# Patient Record
Sex: FEMALE | Race: WHITE | NOT HISPANIC OR LATINO | Employment: FULL TIME | URBAN - METROPOLITAN AREA
[De-identification: names, ages, dates, MRNs, and addresses within clinical notes are randomized per-mention and may not be internally consistent; named-entity substitution may affect disease eponyms.]

---
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
## 2024-01-17 ENCOUNTER — OFFICE VISIT (OUTPATIENT)
Dept: CARDIOLOGY | Facility: CLINIC | Age: 19
End: 2024-01-17
Payer: COMMERCIAL

## 2024-01-17 VITALS
HEART RATE: 99 BPM | HEIGHT: 67 IN | SYSTOLIC BLOOD PRESSURE: 133 MMHG | BODY MASS INDEX: 20.4 KG/M2 | DIASTOLIC BLOOD PRESSURE: 83 MMHG | WEIGHT: 130 LBS

## 2024-01-17 DIAGNOSIS — R55 SYNCOPE AND COLLAPSE: ICD-10-CM

## 2024-01-17 DIAGNOSIS — R00.2 PALPITATIONS: Primary | ICD-10-CM

## 2024-01-17 RX ORDER — ESCITALOPRAM OXALATE 10 MG/1
10 TABLET ORAL DAILY
COMMUNITY

## 2024-01-17 NOTE — ASSESSMENT & PLAN NOTE
No further episodes of syncope, and tilt testing table was negative.  Encouraged to stay well-hydrated, notify us if she has any reoccurring symptoms.

## 2024-01-17 NOTE — LETTER
January 17, 2024     Mindi Ndiaye MD  [address] [facility] [address]    Patient: Washington Leonard   YOB: 2005   Date of Visit: 1/17/2024       Dear Mindi Ndiaye MD    Washington Leonard was in my office today. Below is a copy of my note.    If you have questions, please do not hesitate to call me. I look forward to following Washington along with you.         Sincerely,        ARTEMIO Sullivan        Chief Complaint  Syncope and collapse and Follow-up    Subjective       History of Present Illness  Washington Leonard presents to [facility] CARDIOLOGY   Ms. Leonard is a 18-year-old female patient coming in for cardiac follow-up.  She had evaluation due to dizziness, palpitations and syncopal episodes.  Since last seen in office, she has underwent Holter study, as well as echocardiogram and tilt table testing.  Tilt table testing was negative, echocardiogram showed normal LV function without valvular issues, Holter monitor did show frequent sinus tachycardia.  She was started on beta-blocker therapy.  She has seen improvement in symptoms of palpitations, although she is experiencing fatigue with the medication.  Denies any further episodes of syncope or presyncope.        Past Medical History:   Diagnosis Date   • Anemia    • Anxiety    • Chlamydia    • Depression    • Heart murmur    • Migraine    • Syncope        No Known Allergies     Past Surgical History:   Procedure Laterality Date   • WISDOM TOOTH EXTRACTION Bilateral         Social History  She  reports that she has never smoked. She has never been exposed to tobacco smoke. She has never used smokeless tobacco. She reports that she does not currently use alcohol. She reports that she does not use drugs.    Family History  Her family history includes Anxiety disorder in her mother and sister; Arrhythmia in her paternal grandmother; Breast cancer in her maternal aunt; Cirrhosis in her  "maternal grandmother; Depression in her mother; Diabetes in her maternal grandmother and paternal grandmother; Heart disease in her maternal grandfather; Hypertension in her maternal grandmother.       Current Outpatient Medications on File Prior to Visit   Medication Sig   • D-Mannose 500 MG capsule Take 1 capsule by mouth Daily. Recommended supp for kidneys   • escitalopram (LEXAPRO) 10 MG tablet Take 1 tablet by mouth Daily.   • ibuprofen (ADVIL,MOTRIN) 600 MG tablet Take 1 tablet by mouth Every 8 (Eight) Hours As Needed for Mild Pain.   • metoprolol tartrate (LOPRESSOR) 25 MG tablet Take 1 tablet by mouth Every Night.   • naproxen (NAPROSYN) 375 MG tablet Take 1 tablet by mouth 2 (Two) Times a Day With Meals.   • Tiffany 3-0.02 MG per tablet Take 1 tablet by mouth Daily.     No current facility-administered medications on file prior to visit.         Review of Systems   Constitutional:  Positive for fatigue.   Respiratory:  Negative for cough, chest tightness and shortness of breath.    Cardiovascular:  Negative for chest pain, palpitations and leg swelling.   Gastrointestinal:  Negative for vomiting.   Neurological:  Negative for dizziness and syncope.        Objective  Vitals:    01/17/24 1330   BP: 133/83   BP Location: Left arm   Pulse: 99   Weight: 59 kg (130 lb)   Height: 170.2 cm (67\")         Physical Exam  General : Alert, awake, no acute distress  Neck : Supple, no carotid bruit, no jugular venous distention  CVS : Regular rate and rhythm, no murmur, rubs or gallops  Lungs: Clear to auscultation bilaterally, no crackles or rhonchi  Abdomen: Soft, nontender, bowel sounds active  Extremities: Warm, well-perfused, no pedal edema      Result Review    The following data was reviewed by Kristie Fine, APRN  No results found for: \"PROBNP\"  CMP          4/13/2023    10:43 4/17/2023    11:35   CMP   Glucose 80  93    BUN 7  8    Creatinine 0.65  0.60    EGFR 131.1  133.6    Sodium 137  135    Potassium 4.7  4.1 "    Chloride 104  102    Calcium 10.3  9.4    Total Protein 7.8  7.8    Albumin 4.8  4.5    Globulin 3.0  3.3    Total Bilirubin 0.3  0.4    Alkaline Phosphatase 59  65    AST (SGOT) 25  16    ALT (SGPT) 12  12    Albumin/Globulin Ratio 1.6  1.4    BUN/Creatinine Ratio 10.8  13.3    Anion Gap 10.7  11.8      CBC w/diff          4/17/2023    11:35   CBC w/Diff   WBC 12.12    RBC 4.52    Hemoglobin 13.6    Hematocrit 38.9    MCV 86.1    MCH 30.1    MCHC 35.0    RDW 11.9    Platelets 234    Neutrophil Rel % 84.7    Immature Granulocyte Rel % 0.3    Lymphocyte Rel % 9.6    Monocyte Rel % 5.0    Eosinophil Rel % 0.2    Basophil Rel % 0.2       Lab Results   Component Value Date    TSH 1.870 01/16/2019      Lab Results   Component Value Date    FREET4 1.2 01/16/2019       Results for orders placed in visit on 09/06/23    Adult Transthoracic Echo Complete W/ Cont if Necessary Per Protocol    Interpretation Summary  •  Left ventricular systolic function is normal. Left ventricular ejection fraction appears to be 61 - 65%.  •  Left ventricular diastolic function is normal.  •  There are no significant valvular abnormalities.  •  Estimated right ventricular systolic pressure from tricuspid regurgitation is normal (<35 mmHg).    Tilt Table  8/29/2023    Tilt table test was negative for neurocardiogenic syncope, postural orthostatic tachycardia syndrome and orthostatic syncope.     Holter Monitor  8/15/2023  •  Essentially normal 48-hour Holter monitor study.  •  Underlying rhythm is normal sinus rhythm with an average heart rate of 95 bpm.  41% of the time was spent in sinus tachycardia.  •  There were no arrhythmias.  There was no significant ectopy burden.  •  There were no long pauses.  •  Patient did not report any symptoms during the study.        Assessment and Plan   Diagnoses and all orders for this visit:    1. Palpitations (Primary)  Assessment & Plan:  Recent Holter monitor testing did not show any arrhythmias or  significant ectopy.  However she was frequently in sinus tachycardia.  Symptoms of palpitations have improved with metoprolol, however she is experiencing  fatigue.  Discussed other treatment options, however at this time she prefers to stay on medication, and cope with side effects of fatigue.  Counseled patient regarding risk for intrauterine growth restriction, encouraged her to notify provider if she decides to pursue pregnancy to consider other medication treatment options.      2. Syncope and collapse  Assessment & Plan:  No further episodes of syncope, and tilt testing table was negative.  Encouraged to stay well-hydrated, notify us if she has any reoccurring symptoms.             Follow Up   Return in about 1 year (around 1/17/2025) for with Dr. Zelaya.    Patient was given instructions and counseling regarding her condition or for health maintenance advice. Please see specific information pulled into the AVS if appropriate.     Signed,  Kristie Fine, APRN  01/17/2024     Dictated Utilizing Dragon Dictation: Please note that portions of this note were completed with a voice recognition program.  Part of this note may be an electronic transcription/translation of spoken language to printed text using the Dragon Dictation System.

## 2024-01-17 NOTE — ASSESSMENT & PLAN NOTE
Recent Holter monitor testing did not show any arrhythmias or significant ectopy.  However she was frequently in sinus tachycardia.  Symptoms of palpitations have improved with metoprolol, however she is experiencing  fatigue.  Discussed other treatment options, however at this time she prefers to stay on medication, and cope with side effects of fatigue.  Counseled patient regarding risk for intrauterine growth restriction, encouraged her to notify provider if she decides to pursue pregnancy to consider other medication treatment options.

## 2024-01-17 NOTE — PROGRESS NOTES
Chief Complaint  Syncope and collapse and Follow-up    Subjective        History of Present Illness  Washington Leonard presents to [facility] CARDIOLOGY   Ms. Leonard is a 18-year-old female patient coming in for cardiac follow-up.  She had evaluation due to dizziness, palpitations and syncopal episodes.  Since last seen in office, she has underwent Holter study, as well as echocardiogram and tilt table testing.  Tilt table testing was negative, echocardiogram showed normal LV function without valvular issues, Holter monitor did show frequent sinus tachycardia.  She was started on beta-blocker therapy.  She has seen improvement in symptoms of palpitations, although she is experiencing fatigue with the medication.  Denies any further episodes of syncope or presyncope.        Past Medical History:   Diagnosis Date    Anemia     Anxiety     Chlamydia     Depression     Heart murmur     Migraine     Syncope        No Known Allergies     Past Surgical History:   Procedure Laterality Date    WISDOM TOOTH EXTRACTION Bilateral         Social History  She  reports that she has never smoked. She has never been exposed to tobacco smoke. She has never used smokeless tobacco. She reports that she does not currently use alcohol. She reports that she does not use drugs.    Family History  Her family history includes Anxiety disorder in her mother and sister; Arrhythmia in her paternal grandmother; Breast cancer in her maternal aunt; Cirrhosis in her maternal grandmother; Depression in her mother; Diabetes in her maternal grandmother and paternal grandmother; Heart disease in her maternal grandfather; Hypertension in her maternal grandmother.       Current Outpatient Medications on File Prior to Visit   Medication Sig    D-Mannose 500 MG capsule Take 1 capsule by mouth Daily. Recommended supp for kidneys    escitalopram (LEXAPRO) 10 MG tablet Take 1 tablet by mouth Daily.    ibuprofen (ADVIL,MOTRIN) 600 MG  "tablet Take 1 tablet by mouth Every 8 (Eight) Hours As Needed for Mild Pain.    metoprolol tartrate (LOPRESSOR) 25 MG tablet Take 1 tablet by mouth Every Night.    naproxen (NAPROSYN) 375 MG tablet Take 1 tablet by mouth 2 (Two) Times a Day With Meals.    Tiffany 3-0.02 MG per tablet Take 1 tablet by mouth Daily.     No current facility-administered medications on file prior to visit.         Review of Systems   Constitutional:  Positive for fatigue.   Respiratory:  Negative for cough, chest tightness and shortness of breath.    Cardiovascular:  Negative for chest pain, palpitations and leg swelling.   Gastrointestinal:  Negative for vomiting.   Neurological:  Negative for dizziness and syncope.        Objective   Vitals:    01/17/24 1330   BP: 133/83   BP Location: Left arm   Pulse: 99   Weight: 59 kg (130 lb)   Height: 170.2 cm (67\")         Physical Exam  General : Alert, awake, no acute distress  Neck : Supple, no carotid bruit, no jugular venous distention  CVS : Regular rate and rhythm, no murmur, rubs or gallops  Lungs: Clear to auscultation bilaterally, no crackles or rhonchi  Abdomen: Soft, nontender, bowel sounds active  Extremities: Warm, well-perfused, no pedal edema      Result Review     The following data was reviewed by Kristie Fine, APRN  No results found for: \"PROBNP\"  CMP          4/13/2023    10:43 4/17/2023    11:35   CMP   Glucose 80  93    BUN 7  8    Creatinine 0.65  0.60    EGFR 131.1  133.6    Sodium 137  135    Potassium 4.7  4.1    Chloride 104  102    Calcium 10.3  9.4    Total Protein 7.8  7.8    Albumin 4.8  4.5    Globulin 3.0  3.3    Total Bilirubin 0.3  0.4    Alkaline Phosphatase 59  65    AST (SGOT) 25  16    ALT (SGPT) 12  12    Albumin/Globulin Ratio 1.6  1.4    BUN/Creatinine Ratio 10.8  13.3    Anion Gap 10.7  11.8      CBC w/diff          4/17/2023    11:35   CBC w/Diff   WBC 12.12    RBC 4.52    Hemoglobin 13.6    Hematocrit 38.9    MCV 86.1    MCH 30.1    MCHC 35.0    RDW " 11.9    Platelets 234    Neutrophil Rel % 84.7    Immature Granulocyte Rel % 0.3    Lymphocyte Rel % 9.6    Monocyte Rel % 5.0    Eosinophil Rel % 0.2    Basophil Rel % 0.2       Lab Results   Component Value Date    TSH 1.870 01/16/2019      Lab Results   Component Value Date    FREET4 1.2 01/16/2019       Results for orders placed in visit on 09/06/23    Adult Transthoracic Echo Complete W/ Cont if Necessary Per Protocol    Interpretation Summary    Left ventricular systolic function is normal. Left ventricular ejection fraction appears to be 61 - 65%.    Left ventricular diastolic function is normal.    There are no significant valvular abnormalities.    Estimated right ventricular systolic pressure from tricuspid regurgitation is normal (<35 mmHg).    Tilt Table  8/29/2023    Tilt table test was negative for neurocardiogenic syncope, postural orthostatic tachycardia syndrome and orthostatic syncope.     Holter Monitor  8/15/2023    Essentially normal 48-hour Holter monitor study.    Underlying rhythm is normal sinus rhythm with an average heart rate of 95 bpm.  41% of the time was spent in sinus tachycardia.    There were no arrhythmias.  There was no significant ectopy burden.    There were no long pauses.    Patient did not report any symptoms during the study.        Assessment and Plan   Diagnoses and all orders for this visit:    1. Palpitations (Primary)  Assessment & Plan:  Recent Holter monitor testing did not show any arrhythmias or significant ectopy.  However she was frequently in sinus tachycardia.  Symptoms of palpitations have improved with metoprolol, however she is experiencing  fatigue.  Discussed other treatment options, however at this time she prefers to stay on medication, and cope with side effects of fatigue.  Counseled patient regarding risk for intrauterine growth restriction, encouraged her to notify provider if she decides to pursue pregnancy to consider other medication treatment  options.      2. Syncope and collapse  Assessment & Plan:  No further episodes of syncope, and tilt testing table was negative.  Encouraged to stay well-hydrated, notify us if she has any reoccurring symptoms.             Follow Up   Return in about 1 year (around 1/17/2025) for with Dr. Zelaya.    Patient was given instructions and counseling regarding her condition or for health maintenance advice. Please see specific information pulled into the AVS if appropriate.     Signed,  Kristie Fine, APRN  01/17/2024     Dictated Utilizing Dragon Dictation: Please note that portions of this note were completed with a voice recognition program.  Part of this note may be an electronic transcription/translation of spoken language to printed text using the Dragon Dictation System.

## 2024-01-31 ENCOUNTER — TELEPHONE (OUTPATIENT)
Dept: UROLOGY | Facility: CLINIC | Age: 19
End: 2024-01-31

## 2024-02-01 NOTE — TELEPHONE ENCOUNTER
2ND CALL - CALLED PT TO OFFER R/S OF CX'D APPT 1/31 W/ BIB    SPOKE W/ PATIENT WHO DECLINED TO R/S STATING SHE HAS NOT HAD ANY ISSUES SINCE HER LAST VISIT.    ANYTHING ELSE TO DO?

## 2024-07-10 ENCOUNTER — OFFICE VISIT (OUTPATIENT)
Dept: FAMILY MEDICINE CLINIC | Facility: CLINIC | Age: 19
End: 2024-07-10
Payer: COMMERCIAL

## 2024-07-10 VITALS
HEIGHT: 67 IN | DIASTOLIC BLOOD PRESSURE: 65 MMHG | HEART RATE: 95 BPM | OXYGEN SATURATION: 100 % | SYSTOLIC BLOOD PRESSURE: 108 MMHG | BODY MASS INDEX: 23.54 KG/M2 | WEIGHT: 150 LBS

## 2024-07-10 DIAGNOSIS — F41.9 ANXIETY AND DEPRESSION: ICD-10-CM

## 2024-07-10 DIAGNOSIS — K21.9 GASTROESOPHAGEAL REFLUX DISEASE, UNSPECIFIED WHETHER ESOPHAGITIS PRESENT: ICD-10-CM

## 2024-07-10 DIAGNOSIS — Z76.89 ENCOUNTER TO ESTABLISH CARE: Primary | ICD-10-CM

## 2024-07-10 DIAGNOSIS — F32.A ANXIETY AND DEPRESSION: ICD-10-CM

## 2024-07-10 PROCEDURE — 99214 OFFICE O/P EST MOD 30 MIN: CPT | Performed by: NURSE PRACTITIONER

## 2024-07-10 RX ORDER — OMEPRAZOLE 40 MG/1
40 CAPSULE, DELAYED RELEASE ORAL DAILY
Qty: 90 CAPSULE | Refills: 0 | Status: SHIPPED | OUTPATIENT
Start: 2024-07-10

## 2024-07-10 RX ORDER — ESCITALOPRAM OXALATE 10 MG/1
10 TABLET ORAL DAILY
Qty: 90 TABLET | Refills: 1 | Status: SHIPPED | OUTPATIENT
Start: 2024-07-10

## 2024-07-10 RX ORDER — OMEPRAZOLE 40 MG/1
1 CAPSULE, DELAYED RELEASE ORAL DAILY
COMMUNITY
Start: 2024-05-31 | End: 2024-07-10 | Stop reason: SDUPTHER

## 2024-07-10 NOTE — PROGRESS NOTES
Chief Complaint  Establish Care, Depression, and Heartburn    SUBJECTIVE  Washington Leonard presents to [facility] to establish care. Previous PCP was Dr Uriarte at Pediatrics, records on file.    Pt reports no problems or concerns at this time. Pt is needing refills on Lexapro and Prilosec.  Patient states long history of anxiety/depression, well-controlled with Lexapro, states that she has only been on the Prilosec for a few months and has an appointment next month with GI for further evaluation.  States she was started on the Prilosec due to having problems with waking up and vomiting at night.    Pt sees Kristie Fine and Dr Zelaya at Cardiology for Syncope and palpitations.     History of Present Illness  Past Medical History:   Diagnosis Date    Anemia     Anxiety     Asthma 2018    Chlamydia     Depression     Heart murmur     Migraine     Syncope     Urinary tract infection       Family History   Problem Relation Age of Onset    Anxiety disorder Mother     Depression Mother     Miscarriages / Stillbirths Mother     Anxiety disorder Sister     Breast cancer Maternal Aunt     Cirrhosis Maternal Grandmother     Hypertension Maternal Grandmother     Diabetes Maternal Grandmother     Liver disease Maternal Grandmother     Vision loss Maternal Grandmother     Heart disease Maternal Grandfather     Diabetes Paternal Grandmother     Arrhythmia Paternal Grandmother         has a pacemaker    Hypertension Paternal Grandfather     Asthma Brother     Ovarian cancer Neg Hx     Uterine cancer Neg Hx     Melanoma Neg Hx     Pulmonary embolism Neg Hx     Deep vein thrombosis Neg Hx       Past Surgical History:   Procedure Laterality Date    WISDOM TOOTH EXTRACTION Bilateral         Current Outpatient Medications:     D-Mannose 500 MG capsule, Take 1 capsule by mouth Daily. Recommended supp for kidneys, Disp: , Rfl:     escitalopram (LEXAPRO) 10 MG tablet, Take 1 tablet by mouth  "Daily., Disp: 90 tablet, Rfl: 1    ibuprofen (ADVIL,MOTRIN) 600 MG tablet, Take 1 tablet by mouth Every 8 (Eight) Hours As Needed for Mild Pain., Disp: 15 tablet, Rfl: 0    loratadine (CLARITIN) 10 MG tablet, Take 1 tablet by mouth Daily., Disp: , Rfl:     metoprolol tartrate (LOPRESSOR) 25 MG tablet, Take 1 tablet by mouth Every Night., Disp: 90 tablet, Rfl: 3    omeprazole (priLOSEC) 40 MG capsule, Take 1 capsule by mouth Daily., Disp: 90 capsule, Rfl: 0    OBJECTIVE  Vital Signs:   /65   Pulse 95   Ht 170.2 cm (67\")   Wt 68 kg (150 lb)   SpO2 100%   BMI 23.49 kg/m²    Estimated body mass index is 23.49 kg/m² as calculated from the following:    Height as of this encounter: 170.2 cm (67\").    Weight as of this encounter: 68 kg (150 lb).     Wt Readings from Last 3 Encounters:   07/10/24 68 kg (150 lb) (81%, Z= 0.86)*   03/30/24 59 kg (130 lb) (55%, Z= 0.14)*   02/27/24 59 kg (130 lb 1.1 oz) (56%, Z= 0.15)*     * Growth percentiles are based on CDC (Girls, 2-20 Years) data.     BP Readings from Last 3 Encounters:   07/10/24 108/65   03/30/24 124/79   02/27/24 130/73       Physical Exam  Vitals reviewed.   Constitutional:       Appearance: Normal appearance. She is well-developed.   HENT:      Head: Normocephalic and atraumatic.      Right Ear: External ear normal.      Left Ear: External ear normal.   Eyes:      Conjunctiva/sclera: Conjunctivae normal.      Pupils: Pupils are equal, round, and reactive to light.   Cardiovascular:      Rate and Rhythm: Normal rate and regular rhythm.      Heart sounds: No murmur heard.     No friction rub. No gallop.   Pulmonary:      Effort: Pulmonary effort is normal.      Breath sounds: Normal breath sounds. No wheezing or rhonchi.   Skin:     General: Skin is warm and dry.   Neurological:      Mental Status: She is alert and oriented to person, place, and time.      Cranial Nerves: No cranial nerve deficit.   Psychiatric:         Mood and Affect: Mood and affect " normal.         Behavior: Behavior normal.         Thought Content: Thought content normal.         Judgment: Judgment normal.          Result Review      CBC          2/20/2024    09:56   CBC   WBC 5.3       RBC 4.59       Hemoglobin 13.5       Hematocrit 40.9       MCV 89.1       MCH 29.4       MCHC 33.0       RDW 11.9       Platelets 242          Details          This result is from an external source.             Lipid Panel          2/20/2024    10:22   Lipid Panel   Total Cholesterol 71       LDL Cholesterol  128          Details          This result is from an external source.                     No Images in the past 120 days found..     The above data has been reviewed by ARTEMIO Fuller 07/10/2024 12:55 EDT.          Patient Care Team:  Madalyn Philippe APRN as PCP - General (Nurse Practitioner)  Cristy Winters APRN as Nurse Practitioner (Urology)    Pediatric BMI = 69 %ile (Z= 0.49) based on CDC (Girls, 2-20 Years) BMI-for-age based on BMI available as of 7/10/2024.. BMI is within normal parameters. No other follow-up for BMI required.       ASSESSMENT & PLAN    Diagnoses and all orders for this visit:    1. Encounter to establish care (Primary)    2. Anxiety and depression  Assessment & Plan:  Stable and well-controlled Lexapro, continue current medication    Orders:  -     escitalopram (LEXAPRO) 10 MG tablet; Take 1 tablet by mouth Daily.  Dispense: 90 tablet; Refill: 1    3. Gastroesophageal reflux disease, unspecified whether esophagitis present  Assessment & Plan:  Patient started on Prilosec a few months ago for suspected GERD and vomiting, doing well on the medication, has appointment with GI next month, will follow-up with them at that time    Orders:  -     omeprazole (priLOSEC) 40 MG capsule; Take 1 capsule by mouth Daily.  Dispense: 90 capsule; Refill: 0         Tobacco Use: Low Risk  (7/10/2024)    Patient History     Smoking Tobacco Use: Never     Smokeless Tobacco Use:  Never     Passive Exposure: Never       Follow Up     Return in about 6 months (around 1/10/2025), or if symptoms worsen or fail to improve.        Patient was given instructions and counseling regarding her condition or for health maintenance advice. Please see specific information pulled into the AVS if appropriate.   I have reviewed information obtained and documented by others and I have confirmed the accuracy of this documented note.    Madalyn Philippe APRN

## 2024-07-10 NOTE — ASSESSMENT & PLAN NOTE
Patient started on Prilosec a few months ago for suspected GERD and vomiting, doing well on the medication, has appointment with GI next month, will follow-up with them at that time

## 2024-07-25 ENCOUNTER — LAB (OUTPATIENT)
Dept: LAB | Facility: HOSPITAL | Age: 19
End: 2024-07-25
Payer: COMMERCIAL

## 2024-07-25 ENCOUNTER — OFFICE VISIT (OUTPATIENT)
Dept: FAMILY MEDICINE CLINIC | Facility: CLINIC | Age: 19
End: 2024-07-25
Payer: COMMERCIAL

## 2024-07-25 ENCOUNTER — TELEPHONE (OUTPATIENT)
Dept: FAMILY MEDICINE CLINIC | Facility: CLINIC | Age: 19
End: 2024-07-25

## 2024-07-25 VITALS
WEIGHT: 155 LBS | BODY MASS INDEX: 24.33 KG/M2 | OXYGEN SATURATION: 100 % | SYSTOLIC BLOOD PRESSURE: 115 MMHG | DIASTOLIC BLOOD PRESSURE: 61 MMHG | HEART RATE: 98 BPM | HEIGHT: 67 IN

## 2024-07-25 DIAGNOSIS — F32.A ANXIETY AND DEPRESSION: ICD-10-CM

## 2024-07-25 DIAGNOSIS — N92.6 MISSED PERIOD: ICD-10-CM

## 2024-07-25 DIAGNOSIS — F41.9 ANXIETY AND DEPRESSION: ICD-10-CM

## 2024-07-25 DIAGNOSIS — N92.6 MISSED PERIOD: Primary | ICD-10-CM

## 2024-07-25 DIAGNOSIS — R00.2 PALPITATIONS: ICD-10-CM

## 2024-07-25 DIAGNOSIS — Z32.00 POSSIBLE PREGNANCY, NOT CONFIRMED: ICD-10-CM

## 2024-07-25 PROCEDURE — 36415 COLL VENOUS BLD VENIPUNCTURE: CPT

## 2024-07-25 PROCEDURE — 99214 OFFICE O/P EST MOD 30 MIN: CPT | Performed by: NURSE PRACTITIONER

## 2024-07-25 PROCEDURE — 84702 CHORIONIC GONADOTROPIN TEST: CPT

## 2024-07-25 NOTE — PROGRESS NOTES
"Chief Complaint  Possible Pregnancy    SUBJECTIVE  Washington Leonard presents to [facility] due to positive home pregnancy test.    LMP 06/08/24     2 pos. Home preg tests on July 23     Patient reports that she is not really having any symptoms of pregnancy, she has already called and scheduled an appoint with OB/GYN for next month    History of Present Illness  Past Medical History:   Diagnosis Date    Anemia     Anxiety     Asthma 2018    Chlamydia     Depression     Heart murmur     Migraine     Syncope     Urinary tract infection       Family History   Problem Relation Age of Onset    Anxiety disorder Mother     Depression Mother     Miscarriages / Stillbirths Mother     Anxiety disorder Sister     Breast cancer Maternal Aunt     Cirrhosis Maternal Grandmother     Hypertension Maternal Grandmother     Diabetes Maternal Grandmother     Liver disease Maternal Grandmother     Vision loss Maternal Grandmother     Heart disease Maternal Grandfather     Diabetes Paternal Grandmother     Arrhythmia Paternal Grandmother         has a pacemaker    Hypertension Paternal Grandfather     Asthma Brother     Ovarian cancer Neg Hx     Uterine cancer Neg Hx     Melanoma Neg Hx     Pulmonary embolism Neg Hx     Deep vein thrombosis Neg Hx       Past Surgical History:   Procedure Laterality Date    WISDOM TOOTH EXTRACTION Bilateral         Current Outpatient Medications:     D-Mannose 500 MG capsule, Take 1 capsule by mouth Daily. Recommended supp for kidneys, Disp: , Rfl:     escitalopram (LEXAPRO) 10 MG tablet, Take 1 tablet by mouth Daily., Disp: 90 tablet, Rfl: 1    loratadine (CLARITIN) 10 MG tablet, Take 1 tablet by mouth Daily., Disp: , Rfl:     metoprolol tartrate (LOPRESSOR) 25 MG tablet, Take 1 tablet by mouth Every Night., Disp: 90 tablet, Rfl: 3    OBJECTIVE  Vital Signs:   /61   Pulse 98   Ht 170.2 cm (67\")   Wt 70.3 kg (155 lb)   SpO2 100%   BMI 24.28 kg/m²  " "  Estimated body mass index is 24.28 kg/m² as calculated from the following:    Height as of this encounter: 170.2 cm (67\").    Weight as of this encounter: 70.3 kg (155 lb).     Wt Readings from Last 3 Encounters:   07/25/24 70.3 kg (155 lb) (84%, Z= 1.01)*   07/10/24 68 kg (150 lb) (81%, Z= 0.86)*   03/30/24 59 kg (130 lb) (55%, Z= 0.14)*     * Growth percentiles are based on CDC (Girls, 2-20 Years) data.     BP Readings from Last 3 Encounters:   07/25/24 115/61   07/10/24 108/65   03/30/24 124/79       Physical Exam  Vitals reviewed.   Constitutional:       Appearance: Normal appearance. She is well-developed.   HENT:      Head: Normocephalic and atraumatic.      Right Ear: External ear normal.      Left Ear: External ear normal.   Eyes:      Conjunctiva/sclera: Conjunctivae normal.      Pupils: Pupils are equal, round, and reactive to light.   Cardiovascular:      Rate and Rhythm: Normal rate and regular rhythm.      Heart sounds: No murmur heard.     No friction rub. No gallop.   Pulmonary:      Effort: Pulmonary effort is normal.      Breath sounds: Normal breath sounds. No wheezing or rhonchi.   Skin:     General: Skin is warm and dry.   Neurological:      Mental Status: She is alert and oriented to person, place, and time.      Cranial Nerves: No cranial nerve deficit.   Psychiatric:         Mood and Affect: Mood and affect normal.         Behavior: Behavior normal.         Thought Content: Thought content normal.         Judgment: Judgment normal.          Result Review        No Images in the past 120 days found..     The above data has been reviewed by ARTEMIO Fuller 07/25/2024 09:55 EDT.          Patient Care Team:  Madalyn Philippe APRN as PCP - General (Nurse Practitioner)  Cristy Winters APRN as Nurse Practitioner (Urology)    Pediatric BMI = 75 %ile (Z= 0.66) based on CDC (Girls, 2-20 Years) BMI-for-age based on BMI available as of 7/25/2024.. BMI is within normal parameters. No " other follow-up for BMI required.       ASSESSMENT & PLAN    Diagnoses and all orders for this visit:    1. Missed period (Primary)  -     HCG, B-subunit, Quantitative; Future    2. Possible pregnancy, not confirmed  -     HCG, B-subunit, Quantitative; Future    3. Anxiety and depression  Assessment & Plan:  Discussed with patient that as she has been stable and well-controlled on this medication for some time I would recommend continuing for now, she can discuss this further with OB/GYN      4. Palpitations  Assessment & Plan:  Discussed with patient once pregnancy is confirmed I recommend she contact cardiology to see if they do wish to change her metoprolol or continue      Start OTC prenatal vitamin with folic acid if confirmed    Tobacco Use: Low Risk  (7/25/2024)    Patient History     Smoking Tobacco Use: Never     Smokeless Tobacco Use: Never     Passive Exposure: Never       Follow Up     Return if symptoms worsen or fail to improve.        Patient was given instructions and counseling regarding her condition or for health maintenance advice. Please see specific information pulled into the AVS if appropriate.   I have reviewed information obtained and documented by others and I have confirmed the accuracy of this documented note.    ARTEMIO Fuller

## 2024-07-25 NOTE — ASSESSMENT & PLAN NOTE
Discussed with patient that as she has been stable and well-controlled on this medication for some time I would recommend continuing for now, she can discuss this further with OB/GYN

## 2024-07-25 NOTE — ASSESSMENT & PLAN NOTE
Discussed with patient once pregnancy is confirmed I recommend she contact cardiology to see if they do wish to change her metoprolol or continue

## 2024-07-25 NOTE — TELEPHONE ENCOUNTER
Caller: Washington Leonard    Relationship: Self    Best call back number: [phone]     Caller requesting test results: YES     What test was performed: BLOOD PREGNANCY     When was the test performed: 07/25/24     Where was the test performed: IN OFFICE     Additional notes: PATIENT IS CALLING REQUESTING FOR TEST RESULTS

## 2024-07-26 LAB — HCG INTACT+B SERPL-ACNC: 2940 MIU/ML

## 2024-07-26 NOTE — TELEPHONE ENCOUNTER
Per Madalyn, Lab shows pt is 5-6 weeks pregnant. Recommend keeping appt set with OB next month and starting an over-the-counter prenatal vitamin with folic acid   Left results on VM ok per HIPAA.     OK FOR HUB TO RELAY IF PT RETURNS CALL.

## 2024-07-29 ENCOUNTER — TELEPHONE (OUTPATIENT)
Dept: FAMILY MEDICINE CLINIC | Facility: CLINIC | Age: 19
End: 2024-07-29
Payer: COMMERCIAL

## 2024-07-29 NOTE — TELEPHONE ENCOUNTER
Caller: Washington Leonard    Relationship: Self    Best call back number: [phone]     What medication are you requesting: ZOFRAN    What are your current symptoms: MORNING SICKNESS    How long have you been experiencing symptoms: THREE DAYS    Have you had these symptoms before:    [] Yes  [x] No    Have you been treated for these symptoms before:   [] Yes  [x] No    If a prescription is needed, what is your preferred pharmacy and phone number[facility] - [phone] [facility] [phone] FX

## 2024-07-31 ENCOUNTER — TELEPHONE (OUTPATIENT)
Dept: OBSTETRICS AND GYNECOLOGY | Facility: CLINIC | Age: 19
End: 2024-07-31
Payer: COMMERCIAL

## 2024-07-31 ENCOUNTER — HOSPITAL ENCOUNTER (EMERGENCY)
Facility: HOSPITAL | Age: 19
Discharge: HOME | End: 2024-07-31
Attending: EMERGENCY MEDICINE
Payer: COMMERCIAL

## 2024-07-31 VITALS
TEMPERATURE: 98.4 F | RESPIRATION RATE: 18 BRPM | DIASTOLIC BLOOD PRESSURE: 61 MMHG | SYSTOLIC BLOOD PRESSURE: 104 MMHG | WEIGHT: 153 LBS | BODY MASS INDEX: 23.19 KG/M2 | OXYGEN SATURATION: 100 % | HEART RATE: 86 BPM | HEIGHT: 68 IN

## 2024-07-31 DIAGNOSIS — Z3A.01 6 WEEKS GESTATION OF PREGNANCY: ICD-10-CM

## 2024-07-31 DIAGNOSIS — R42 EPISODIC LIGHTHEADEDNESS: Primary | ICD-10-CM

## 2024-07-31 DIAGNOSIS — H65.192 ACUTE EFFUSION OF LEFT EAR: ICD-10-CM

## 2024-07-31 LAB
ALBUMIN SERPL-MCNC: 4.2 G/DL (ref 3.5–5.2)
ALBUMIN/GLOB SERPL: 1.4 G/DL
ALP SERPL-CCNC: 93 U/L (ref 39–117)
ALT SERPL W P-5'-P-CCNC: 17 U/L (ref 1–33)
ANION GAP SERPL CALCULATED.3IONS-SCNC: 10.8 MMOL/L (ref 5–15)
AST SERPL-CCNC: 18 U/L (ref 1–32)
BACTERIA UR QL AUTO: NORMAL /HPF
BASOPHILS # BLD AUTO: 0.03 10*3/MM3 (ref 0–0.2)
BASOPHILS NFR BLD AUTO: 0.4 % (ref 0–1.5)
BILIRUB SERPL-MCNC: 0.2 MG/DL (ref 0–1.2)
BILIRUB UR QL STRIP: NEGATIVE
BUN SERPL-MCNC: 8 MG/DL (ref 6–20)
BUN/CREAT SERPL: 13.8 (ref 7–25)
CALCIUM SPEC-SCNC: 9.3 MG/DL (ref 8.6–10.5)
CHLORIDE SERPL-SCNC: 102 MMOL/L (ref 98–107)
CLARITY UR: ABNORMAL
CO2 SERPL-SCNC: 24.2 MMOL/L (ref 22–29)
COLOR UR: YELLOW
CREAT SERPL-MCNC: 0.58 MG/DL (ref 0.57–1)
DEPRECATED RDW RBC AUTO: 41 FL (ref 37–54)
EGFRCR SERPLBLD CKD-EPI 2021: 133.9 ML/MIN/1.73
EOSINOPHIL # BLD AUTO: 0.09 10*3/MM3 (ref 0–0.4)
EOSINOPHIL NFR BLD AUTO: 1.1 % (ref 0.3–6.2)
ERYTHROCYTE [DISTWIDTH] IN BLOOD BY AUTOMATED COUNT: 13.2 % (ref 12.3–15.4)
GLOBULIN UR ELPH-MCNC: 2.9 GM/DL
GLUCOSE SERPL-MCNC: 94 MG/DL (ref 65–99)
GLUCOSE UR STRIP-MCNC: NEGATIVE MG/DL
HCG INTACT+B SERPL-ACNC: NORMAL MIU/ML
HCT VFR BLD AUTO: 38.7 % (ref 34–46.6)
HGB BLD-MCNC: 12.9 G/DL (ref 12–15.9)
HGB UR QL STRIP.AUTO: NEGATIVE
HOLD SPECIMEN: NORMAL
HOLD SPECIMEN: NORMAL
HYALINE CASTS UR QL AUTO: NORMAL /LPF
IMM GRANULOCYTES # BLD AUTO: 0.01 10*3/MM3 (ref 0–0.05)
IMM GRANULOCYTES NFR BLD AUTO: 0.1 % (ref 0–0.5)
KETONES UR QL STRIP: ABNORMAL
LEUKOCYTE ESTERASE UR QL STRIP.AUTO: ABNORMAL
LYMPHOCYTES # BLD AUTO: 1.91 10*3/MM3 (ref 0.7–3.1)
LYMPHOCYTES NFR BLD AUTO: 22.3 % (ref 19.6–45.3)
MCH RBC QN AUTO: 28.3 PG (ref 26.6–33)
MCHC RBC AUTO-ENTMCNC: 33.3 G/DL (ref 31.5–35.7)
MCV RBC AUTO: 84.9 FL (ref 79–97)
MONOCYTES # BLD AUTO: 0.73 10*3/MM3 (ref 0.1–0.9)
MONOCYTES NFR BLD AUTO: 8.5 % (ref 5–12)
NEUTROPHILS NFR BLD AUTO: 5.79 10*3/MM3 (ref 1.7–7)
NEUTROPHILS NFR BLD AUTO: 67.6 % (ref 42.7–76)
NITRITE UR QL STRIP: NEGATIVE
NRBC BLD AUTO-RTO: 0 /100 WBC (ref 0–0.2)
PH UR STRIP.AUTO: 7 [PH] (ref 5–8)
PLATELET # BLD AUTO: 255 10*3/MM3 (ref 140–450)
PMV BLD AUTO: 9.7 FL (ref 6–12)
POTASSIUM SERPL-SCNC: 4 MMOL/L (ref 3.5–5.2)
PROT SERPL-MCNC: 7.1 G/DL (ref 6–8.5)
PROT UR QL STRIP: NEGATIVE
RBC # BLD AUTO: 4.56 10*6/MM3 (ref 3.77–5.28)
RBC # UR STRIP: NORMAL /HPF
REF LAB TEST METHOD: NORMAL
SODIUM SERPL-SCNC: 137 MMOL/L (ref 136–145)
SP GR UR STRIP: 1.01 (ref 1–1.03)
SQUAMOUS #/AREA URNS HPF: NORMAL /HPF
UROBILINOGEN UR QL STRIP: ABNORMAL
WBC # UR STRIP: NORMAL /HPF
WBC NRBC COR # BLD AUTO: 8.56 10*3/MM3 (ref 3.4–10.8)
WHOLE BLOOD HOLD COAG: NORMAL
WHOLE BLOOD HOLD SPECIMEN: NORMAL

## 2024-07-31 PROCEDURE — 81001 URINALYSIS AUTO W/SCOPE: CPT | Performed by: EMERGENCY MEDICINE

## 2024-07-31 PROCEDURE — 99283 EMERGENCY DEPT VISIT LOW MDM: CPT

## 2024-07-31 PROCEDURE — 25810000003 SODIUM CHLORIDE 0.9 % SOLUTION

## 2024-07-31 PROCEDURE — 85025 COMPLETE CBC W/AUTO DIFF WBC: CPT | Performed by: EMERGENCY MEDICINE

## 2024-07-31 PROCEDURE — 93005 ELECTROCARDIOGRAM TRACING: CPT

## 2024-07-31 PROCEDURE — 80053 COMPREHEN METABOLIC PANEL: CPT | Performed by: EMERGENCY MEDICINE

## 2024-07-31 PROCEDURE — 25010000002 ONDANSETRON PER 1 MG

## 2024-07-31 PROCEDURE — 84702 CHORIONIC GONADOTROPIN TEST: CPT | Performed by: EMERGENCY MEDICINE

## 2024-07-31 PROCEDURE — 36415 COLL VENOUS BLD VENIPUNCTURE: CPT

## 2024-07-31 PROCEDURE — 96374 THER/PROPH/DIAG INJ IV PUSH: CPT

## 2024-07-31 PROCEDURE — 93005 ELECTROCARDIOGRAM TRACING: CPT | Performed by: EMERGENCY MEDICINE

## 2024-07-31 RX ORDER — PROMETHAZINE HYDROCHLORIDE 12.5 MG/1
12.5 TABLET ORAL EVERY 6 HOURS PRN
Qty: 20 TABLET | Refills: 0 | Status: SHIPPED | OUTPATIENT
Start: 2024-07-31

## 2024-07-31 RX ORDER — SODIUM CHLORIDE 0.9 % (FLUSH) 0.9 %
10 SYRINGE (ML) INJECTION AS NEEDED
Status: DISCONTINUED | OUTPATIENT
Start: 2024-07-31 | End: 2024-08-01 | Stop reason: HOSPADM

## 2024-07-31 RX ORDER — ONDANSETRON 2 MG/ML
4 INJECTION INTRAMUSCULAR; INTRAVENOUS ONCE
Status: COMPLETED | OUTPATIENT
Start: 2024-07-31 | End: 2024-07-31

## 2024-07-31 RX ADMIN — SODIUM CHLORIDE 1000 ML: 9 INJECTION, SOLUTION INTRAVENOUS at 22:20

## 2024-07-31 RX ADMIN — ONDANSETRON 4 MG: 2 INJECTION INTRAMUSCULAR; INTRAVENOUS at 22:20

## 2024-07-31 NOTE — TELEPHONE ENCOUNTER
Patient called and voiced she has been having low blood pressure recently and dizziness, brain fog, nausea morning sickness patient voiced was taking toprol due to a fast heart rate and was advised to stop the medication cold turkey wants to know if there is anything that can she can do or can be done would like to be seen sooner before her physical to get this figured out. Has physical appointment with  on 08/28/2024. Looks like sooner appointment as been made for 08/08/24 with  and physical is still going to be on 08/28/2024.

## 2024-07-31 NOTE — TELEPHONE ENCOUNTER
I received a message stating patient is having dizziness and lightheadedness on Toprol.  I contacted patient.  She states she was taking toprol given by her cardiologist.  She states her last dose was middle of last week.  She states her current symptoms started on Monday and have progressively gotten worse.  Patient's LMP was 6/8/24 and she has had a positive pregnancy test.  She has an initial OB visit scheduled w Dr. Yang on 8/28/24.  She states she has been able to eat some plain foods and hold down some Gatorade or sprite but she has had some vomiting.  She states her urine is very dark in color and is concerned she may be dehydrated.  I have scheduled the patient a sooner confirmation of pregnancy appointment.  However, I strongly recommended she go to the ED for evaluation for dehydration due to her current symptoms.  Patient voiced understanding.

## 2024-08-01 LAB
QT INTERVAL: 348 MS
QTC INTERVAL: 439 MS

## 2024-08-01 NOTE — ED PROVIDER NOTES
Time: 8:37 PM EDT  Date of encounter:  7/31/2024  Independent Historian/Clinical History and Information was obtained by:   Patient    History is limited by: N/A    Chief Complaint: Vomiting      History of Present Illness:  Patient is a 18 y.o. year old female who presents to the emergency department for evaluation of vomiting.  Patient states that she is approximately 5 or 6 weeks gestation and has been vomiting which has caused her to feel weak.  She called her PCP who encouraged her to come to the ED for further evaluation.  Denies abdominal cramping or vaginal bleeding.  ARTEMIO Dunne FNP-C/ENP    Patient is a 18-year-old female presenting today due to nausea, vomiting, dizziness and lightheadedness since Monday.  Patient states she just found out she was pregnant she is currently 5 to 6 weeks gestation.  Her LMP was June 8.  She is scheduled to see her OB/GYN next Thursday.  She has recently stopped taking metoprolol which she states she took for elevated heart rate last Friday.  She admits to nausea and vomiting related to pregnancy.  Denies diarrhea and vaginal bleeding.  Patient states she has not been drinking enough water.    HPI    Patient Care Team  Primary Care Provider: Madalyn Philippe APRN    Past Medical History:     No Known Allergies  Past Medical History:   Diagnosis Date    Anemia     Anxiety     Asthma 2018    Chlamydia     Depression     Heart murmur     Migraine     Syncope     Urinary tract infection      Past Surgical History:   Procedure Laterality Date    WISDOM TOOTH EXTRACTION Bilateral      Family History   Problem Relation Age of Onset    Anxiety disorder Mother     Depression Mother     Miscarriages / Stillbirths Mother     Anxiety disorder Sister     Breast cancer Maternal Aunt     Cirrhosis Maternal Grandmother     Hypertension Maternal Grandmother     Diabetes Maternal Grandmother     Liver disease Maternal Grandmother     Vision loss Maternal Grandmother     Heart disease  "Maternal Grandfather     Diabetes Paternal Grandmother     Arrhythmia Paternal Grandmother         has a pacemaker    Hypertension Paternal Grandfather     Asthma Brother     Ovarian cancer Neg Hx     Uterine cancer Neg Hx     Melanoma Neg Hx     Pulmonary embolism Neg Hx     Deep vein thrombosis Neg Hx        Home Medications:  Prior to Admission medications    Medication Sig Start Date End Date Taking? Authorizing Provider   D-Mannose 500 MG capsule Take 1 capsule by mouth Daily. Recommended supp for kidneys    Bridgette Davis MD   escitalopram (LEXAPRO) 10 MG tablet Take 1 tablet by mouth Daily. 7/10/24   Madalyn Philippe APRN   loratadine (CLARITIN) 10 MG tablet Take 1 tablet by mouth Daily. 1/28/24   ProviderBridgette MD   metoprolol tartrate (LOPRESSOR) 25 MG tablet Take 1 tablet by mouth Every Night. 10/12/23   Kristie Fine APRN        Social History:   Social History     Tobacco Use    Smoking status: Never     Passive exposure: Never    Smokeless tobacco: Never   Vaping Use    Vaping status: Every Day    Start date: 8/1/2021    Substances: Nicotine    Devices: Disposable   Substance Use Topics    Alcohol use: Not Currently    Drug use: Never         Review of Systems:  Review of Systems   Constitutional: Negative.    HENT: Negative.     Eyes: Negative.    Respiratory: Negative.     Cardiovascular: Negative.    Gastrointestinal: Negative.  Positive for nausea and vomiting.   Endocrine: Negative.    Genitourinary: Negative.  Negative for vaginal bleeding.   Musculoskeletal: Negative.    Skin: Negative.    Allergic/Immunologic: Negative.    Neurological: Negative.  Positive for dizziness and light-headedness.   Hematological: Negative.    Psychiatric/Behavioral: Negative.          Physical Exam:  /70 (Patient Position: Standing)   Pulse 97   Temp 98.3 °F (36.8 °C) (Oral)   Resp 18   Ht 172.7 cm (68\")   Wt 69.4 kg (153 lb)   LMP 03/23/2024 (Exact Date)   SpO2 98%   BMI 23.26 " kg/m²     Physical Exam  Vitals and nursing note reviewed.   Constitutional:       Appearance: Normal appearance.   HENT:      Head: Normocephalic and atraumatic.      Right Ear: Tympanic membrane normal. No middle ear effusion.      Left Ear: A middle ear effusion is present.      Nose: Nose normal.      Mouth/Throat:      Mouth: Mucous membranes are moist.   Eyes:      Extraocular Movements: Extraocular movements intact.      Conjunctiva/sclera: Conjunctivae normal.      Pupils: Pupils are equal, round, and reactive to light.   Cardiovascular:      Rate and Rhythm: Normal rate and regular rhythm.      Heart sounds: Normal heart sounds.   Pulmonary:      Effort: Pulmonary effort is normal.      Breath sounds: Normal breath sounds.   Musculoskeletal:         General: Normal range of motion.      Cervical back: Normal range of motion and neck supple.   Skin:     General: Skin is warm and dry.   Neurological:      General: No focal deficit present.      Mental Status: She is alert and oriented to person, place, and time.   Psychiatric:         Mood and Affect: Mood normal.         Behavior: Behavior normal.                Procedures:  Procedures      Medical Decision Making:      Comorbidities that affect care:    Anemia, Asthma, Pregnancy    External Notes reviewed:    Previous Clinic Note: Office visit with PCP 7/25/2024      The following orders were placed and all results were independently analyzed by me:  Orders Placed This Encounter   Procedures    [place] Draw    Comprehensive Metabolic Panel    Urinalysis With Microscopic If Indicated (No Culture) - Urine, Clean Catch    CBC Auto Differential    hCG, Quantitative, Pregnancy    Urinalysis, Microscopic Only - Urine, Clean Catch    NPO Diet NPO Type: Strict NPO    Undress & Gown    Continuous Pulse Oximetry    Vital Signs    Orthostatic Blood Pressure    Orthostatic Vitals (Blood Pressure & Heart Rate)    Oxygen Therapy- Nasal Cannula; Titrate 1-6 LPM Per SpO2;  90 - 95%    POC Glucose Once    ECG 12 Lead ED Triage Standing Order; Weak / Dizzy / AMS    Insert Peripheral IV    Fall Precautions    CBC & Differential    Green Top (Gel)    Lavender Top    Gold Top - SST    Light Blue Top       Medications Given in the Emergency Department:  Medications   sodium chloride 0.9 % flush 10 mL (has no administration in time range)   sodium chloride 0.9 % bolus 1,000 mL (1,000 mL Intravenous New Bag 7/31/24 2220)   ondansetron (ZOFRAN) injection 4 mg (4 mg Intravenous Given 7/31/24 2220)        ED Course:    ED Course as of 07/31/24 2306 Wed Jul 31, 2024 2037 -- PROVIDER IN TRIAGE NOTE ---    The patient was evaluated by Myrna alexandre APRN, in triage. Orders were placed and the patient is currently awaiting disposition.    [CB]   2244 Orthostats negative [AJ]      ED Course User Index  [AJ] Haley Hilton PA-C  [CB] Myrna Roche APRN       Labs:    Lab Results (last 24 hours)       Procedure Component Value Units Date/Time    CBC & Differential [[id number]]  (Normal) Collected: 07/31/24 2116    Specimen: Blood Updated: 07/31/24 2122    Narrative:      The following orders were created for panel order CBC & Differential.  Procedure                               Abnormality         Status                     ---------                               -----------         ------                     CBC Auto Differential[[id number]]        Normal              Final result                 Please view results for these tests on the individual orders.    Comprehensive Metabolic Panel [[id number]] Collected: 07/31/24 2116    Specimen: Blood Updated: 07/31/24 2141     Glucose 94 mg/dL      BUN 8 mg/dL      Creatinine 0.58 mg/dL      Sodium 137 mmol/L      Potassium 4.0 mmol/L      Chloride 102 mmol/L      CO2 24.2 mmol/L      Calcium 9.3 mg/dL      Total Protein 7.1 g/dL      Albumin 4.2 g/dL      ALT (SGPT) 17 U/L      AST (SGOT) 18 U/L      Alkaline Phosphatase 93 U/L       Total Bilirubin 0.2 mg/dL      Globulin 2.9 gm/dL      A/G Ratio 1.4 g/dL      BUN/Creatinine Ratio 13.8     Anion Gap 10.8 mmol/L      eGFR 133.9 mL/min/1.73     Narrative:      GFR Normal >60  Chronic Kidney Disease <60  Kidney Failure <15      CBC Auto Differential [[id number]]  (Normal) Collected: 07/31/24 2116    Specimen: Blood Updated: 07/31/24 2122     WBC 8.56 10*3/mm3      RBC 4.56 10*6/mm3      Hemoglobin 12.9 g/dL      Hematocrit 38.7 %      MCV 84.9 fL      MCH 28.3 pg      MCHC 33.3 g/dL      RDW 13.2 %      RDW-SD 41.0 fl      MPV 9.7 fL      Platelets 255 10*3/mm3      Neutrophil % 67.6 %      Lymphocyte % 22.3 %      Monocyte % 8.5 %      Eosinophil % 1.1 %      Basophil % 0.4 %      Immature Grans % 0.1 %      Neutrophils, Absolute 5.79 10*3/mm3      Lymphocytes, Absolute 1.91 10*3/mm3      Monocytes, Absolute 0.73 10*3/mm3      Eosinophils, Absolute 0.09 10*3/mm3      Basophils, Absolute 0.03 10*3/mm3      Immature Grans, Absolute 0.01 10*3/mm3      nRBC 0.0 /100 WBC     hCG, Quantitative, Pregnancy [[id number]] Collected: 07/31/24 2116    Specimen: Blood Updated: 07/31/24 2154     HCG Quantitative 20,776.00 mIU/mL     Narrative:      HCG Ranges by Gestational Age    Females - non-pregnant premenopausal   </= 1mIU/mL HCG  Females - postmenopausal               </= 7mIU/mL HCG    3 Weeks       5.4   -      72 mIU/mL  4 Weeks      10.2   -     708 mIU/mL  5 Weeks       217   -   8,245 mIU/mL  6 Weeks       152   -  32,177 mIU/mL  7 Weeks     4,059   - 153,767 mIU/mL  8 Weeks    31,366   - 149,094 mIU/mL  9 Weeks    59,109   - 135,901 mIU/mL  10 Weeks   44,186   - 170,409 mIU/mL  12 Weeks   27,107   - 201,615 mIU/mL  14 Weeks   24,302   -  93,646 mIU/mL  15 Weeks   12,540   -  69,747 mIU/mL  16 Weeks    8,904   -  55,332 mIU/mL  17 Weeks    8,240   -  51,793 mIU/mL  18 Weeks    9,649   -  55,271 mIU/mL      Urinalysis With Microscopic If Indicated (No Culture) - Urine, Clean Catch [[id number]]   (Abnormal) Collected: 07/31/24 2127    Specimen: Urine, Clean Catch Updated: 07/31/24 2141     Color, UA Yellow     Appearance, UA Cloudy     pH, UA 7.0     Specific Gravity, UA 1.014     Glucose, UA Negative     Ketones, UA Trace     Bilirubin, UA Negative     Blood, UA Negative     Protein, UA Negative     Leuk Esterase, UA Trace     Nitrite, UA Negative     Urobilinogen, UA 0.2 E.U./dL    Urinalysis, Microscopic Only - Urine, Clean Catch [[id number]] Collected: 07/31/24 2127    Specimen: Urine, Clean Catch Updated: 07/31/24 2141     RBC, UA 0-2 /HPF      WBC, UA 0-2 /HPF      Bacteria, UA None Seen /HPF      Squamous Epithelial Cells, UA 0-2 /HPF      Hyaline Casts, UA 3-6 /LPF      Methodology Automated Microscopy             Imaging:    No Radiology Exams Resulted Within Past 24 Hours      Differential Diagnosis and Discussion:    Dizziness: Based on the patient's history, signs, and symptoms, the diffential diagnosis includes but is not limited to meningitis, stroke, sepsis, subarachnoid hemorrhage, intracranial bleeding, encephalitis, vertigo, electrolyte imbalance, and metabolic disorders.    All labs were reviewed and interpreted by me.  EKG was interpreted by me.  EKG was interpreted by supervising attending.    MDM     Amount and/or Complexity of Data Reviewed  Clinical lab tests: reviewed  Tests in the medicine section of CPT®: reviewed                 Patient Care Considerations:    CHEST X-RAY: I considered ordering a chest x-ray however however patient is pregnant ANTIBIOTICS: I considered prescribing antibiotics as an outpatient however no bacterial focus of infection was found.      Consultants/Shared Management Plan:    None    Social Determinants of Health:    Patient is independent, reliable, and has access to care.       Disposition and Care Coordination:    Discharged: The patient is suitable and stable for discharge with no need for consideration of admission.    I have explained the patient´s  condition, diagnoses and treatment plan based on the information available to me at this time. I have answered questions and addressed any concerns. The patient has a good  understanding of the patient´s diagnosis, condition, and treatment plan as can be expected at this point. The vital signs have been stable. The patient´s condition is stable and appropriate for discharge from the emergency department.      The patient will pursue further outpatient evaluation with the primary care physician or other designated or consulting physician as outlined in the discharge instructions. They are agreeable to this plan of care and follow-up instructions have been explained in detail. The patient has received these instructions in written format and has expressed an understanding of the discharge instructions. The patient is aware that any significant change in condition or worsening of symptoms should prompt an immediate return to this or the closest emergency department or call to 911.    Final diagnoses:   Episodic lightheadedness   6 weeks gestation of pregnancy   Acute effusion of left ear        ED Disposition       ED Disposition   Discharge    Condition   Stable    Comment   --               This medical record created using voice recognition software.             Haley Hilton PA-C  07/31/24 [id number]

## 2024-08-01 NOTE — DISCHARGE INSTRUCTIONS
Your blood work shows that you are around 6 weeks pregnant.  All electrolytes within normal.  Urine negative for UTI.  I have sent some Phenergan to the pharmacy for nausea and vomiting, take as needed if your other home remedies do not work    You can use Flonase to help with left ear effusion.  Please follow-up with your PCP

## 2024-08-01 NOTE — ED TRIAGE NOTES
5-6 weeks pregnant (confirmed by family dr per patient).  Extremely dizzy, light headed since Monday.  Spoke with OB GYN (hasn't been seen yet) who thinks she's dehydrated and was told to come in. Denies abdominal cramping or bleeding.

## 2024-08-02 ENCOUNTER — TELEPHONE (OUTPATIENT)
Dept: GASTROENTEROLOGY | Facility: CLINIC | Age: 19
End: 2024-08-02
Payer: COMMERCIAL

## 2024-08-02 NOTE — TELEPHONE ENCOUNTER
Attempted to contact patient to see if they wanted to reschedule at this time. [facility]

## 2024-08-02 NOTE — TELEPHONE ENCOUNTER
----- Message from [facility] [employer] sent at 7/23/2024 11:11 AM EDT -----  Regarding: Appointment canceled  Contact: [phone]  Appointment canceled for Washington Leonard ([id number])  Visit Type: NEW PATIENT  Date        Time      Length    Provider                  Department  7/29/2024    1:30 PM  15 mins.  Rupal June        [facility] [place] [facility]    Reason for Cancellation: Other

## 2024-08-05 NOTE — PROGRESS NOTES
GYN Visit    Chief Complaint   Patient presents with    Confirmation of pregnancy     Hyperemesis, seeing spots, ringing in ears x 1.5 weeks. Has blacked out once. Phenergan has not helped, patient states that zofran does help.        HPI:   18 y.o. LMP: Patient's last menstrual period was 2024 (exact date).  Patient presents today for confirmation of pregnancy.  Patient has had nausea and vomiting.  She also admits to some constipation.  She was seen in the ER due to dizziness and vomiting.  She states Zofran is helping.  She states the last 2 days have been better.  She denies any bleeding or cramping.  She was previously on metoprolol for irregular heartbeat.  She has since stopped that medication because her blood pressure was dropping.    Social History     Substance and Sexual Activity   Sexual Activity Yes    Partners: Male    Birth control/protection: None       History: PMHx, Meds, Allergies, PSHx, Social Hx, and POBHx all reviewed and updated.  Last Completed Pap Smear       This patient has no relevant Health Maintenance data.            PHYSICAL EXAM:  /66   Wt 67.1 kg (148 lb)   LMP 2024 (Exact Date)   BMI 22.50 kg/m²   General- NAD, alert and oriented, appropriate  Psych- Normal mood  Respiratory- No labored breathing  Abdomen- Soft, non distended, non tender  Extremities- No edema  Skin- No lesions, no rashes      Bedside ultrasound revealed single live intrauterine pregnancy, + cardiac motion    ASSESSMENT AND PLAN:  Diagnoses and all orders for this visit:    1. Encounter to determine fetal viability of pregnancy, single or unspecified fetus (Primary)  -     US Ob < 14 Weeks Single or First Gestation; Future    2. Nausea without vomiting  -     ondansetron (Zofran) 4 MG tablet; Take 1 tablet by mouth Every 8 (Eight) Hours As Needed for Nausea or Vomiting for up to 40 days.  Dispense: 30 tablet; Refill: 3    First trimester precautions discussed  Continue prenatal  vitamins  New OB in 2 weeks with dating ultrasound        Follow Up:  Return for New OB.        Gissel Yang DO  08/08/2024    [facility]  [address] [place] [address]  Dept: [phone]  Dept Fax: [phone]  Loc: [phone]  Loc Fax: [phone]

## 2024-08-08 ENCOUNTER — OFFICE VISIT (OUTPATIENT)
Dept: OBSTETRICS AND GYNECOLOGY | Facility: CLINIC | Age: 19
End: 2024-08-08
Payer: COMMERCIAL

## 2024-08-08 VITALS — WEIGHT: 148 LBS | SYSTOLIC BLOOD PRESSURE: 118 MMHG | BODY MASS INDEX: 22.5 KG/M2 | DIASTOLIC BLOOD PRESSURE: 66 MMHG

## 2024-08-08 DIAGNOSIS — O36.80X0 ENCOUNTER TO DETERMINE FETAL VIABILITY OF PREGNANCY, SINGLE OR UNSPECIFIED FETUS: Primary | ICD-10-CM

## 2024-08-08 DIAGNOSIS — R11.0 NAUSEA WITHOUT VOMITING: ICD-10-CM

## 2024-08-08 RX ORDER — ONDANSETRON 4 MG/1
4 TABLET, FILM COATED ORAL EVERY 8 HOURS PRN
Qty: 30 TABLET | Refills: 3 | Status: SHIPPED | OUTPATIENT
Start: 2024-08-08 | End: 2024-09-17

## 2024-08-13 ENCOUNTER — TELEPHONE (OUTPATIENT)
Dept: OBSTETRICS AND GYNECOLOGY | Facility: CLINIC | Age: 19
End: 2024-08-13
Payer: COMMERCIAL

## 2024-08-13 NOTE — TELEPHONE ENCOUNTER
Caller: Washington Leonard    Relationship to patient: Self    Best call back number: [phone]    Chief complaint: NEEDS TO RESCHEDULE US ON 8/26    Type of visit: US    Requested date: SAME JUST A LATER TIME     If rescheduling, when is the original appointment: 8/26 @3:30     Additional notes:HUB WASN'T ABLE TO REACH THE OFFICE

## 2024-08-26 ENCOUNTER — TELEPHONE (OUTPATIENT)
Dept: OBSTETRICS AND GYNECOLOGY | Facility: CLINIC | Age: 19
End: 2024-08-26
Payer: COMMERCIAL

## 2024-08-27 DIAGNOSIS — R93.89 ABNORMAL ULTRASOUND: Primary | ICD-10-CM

## 2024-08-27 NOTE — PROGRESS NOTES
NEW OBSTETRIC HISTORY AND PHYSICAL     Subjective:  Washington Leonard is a 18 y.o.  at 9w3d here for her new OB visit. Patient pregnancy is dated by a US. She is taking her prenatal vitamins.Reports no loss of fluid or vaginal bleeding.No hx of genetic, bleeding, endocrine, chromosome disorder in both patient and partner. No history of multiple gestations, congenital anomalies or mental retardation.    Current feelings about pregnancy: happy  Current medications: PNV, lexapro  Previous pregnancy hx: G1  Abdominal surgeries: denies  Tobacco, alcohol, illlicit drugs: denies  Hx of STIs including Herpes: denies  Hx of abnormal PAP smear: N/A  Personal Family Hx of Br, uterine, ovarian or colon cancer:  denies    Discussed adequate water intake, food guidelines/weight gain, limit caffiene to less than 200mg daily.   Discussed food, activities to avoid. Discussed seatbelt safety.   Reviewed safe meds in pregnancy handout.  Taking PNV: yes  Smoking cessation needed: no    Reviewed and updated:  OBHx, GYNHx (STDs), PMHx, Medications, Allergies, PSHx, Social Hx, Preventative Hx (PAP), Hx of abuse/safe environment, Vaccine Hx including hx of chickenpox or vaccine, Genetic Hx (pt, FOB, both families).        OB History    Para Term  AB Living   1 0 0 0 0 0   SAB IAB Ectopic Molar Multiple Live Births   0 0 0 0 0 0      # Outcome Date GA Lbr Ben/2nd Weight Sex Type Anes PTL Lv   1 Current              Past Medical History:   Diagnosis Date    Anemia     Anxiety     Asthma 2018    Chlamydia     Depression     Heart murmur     Migraine     Syncope     Urinary tract infection      Past Surgical History:   Procedure Laterality Date    WISDOM TOOTH EXTRACTION Bilateral      Family History   Problem Relation Age of Onset    Anxiety disorder Mother     Depression Mother     Miscarriages / Stillbirths Mother     Anxiety disorder Sister     Breast cancer Maternal Aunt     Cirrhosis Maternal Grandmother      Hypertension Maternal Grandmother     Diabetes Maternal Grandmother     Liver disease Maternal Grandmother     Vision loss Maternal Grandmother     Heart disease Maternal Grandfather     Diabetes Paternal Grandmother     Arrhythmia Paternal Grandmother         has a pacemaker    Hypertension Paternal Grandfather     Asthma Brother     Ovarian cancer Neg Hx     Uterine cancer Neg Hx     Melanoma Neg Hx     Pulmonary embolism Neg Hx     Deep vein thrombosis Neg Hx      No Known Allergies  Social History     Socioeconomic History    Marital status: Single   Tobacco Use    Smoking status: Never     Passive exposure: Never    Smokeless tobacco: Never   Vaping Use    Vaping status: Every Day    Start date: 8/1/2021    Substances: Nicotine    Devices: Disposable   Substance and Sexual Activity    Alcohol use: Not Currently    Drug use: Never    Sexual activity: Yes     Partners: Male     Birth control/protection: None     Last Completed Pap Smear       This patient has no relevant Health Maintenance data.            ROS:  General ROS: negative for - chills or fatigue  Gastrointestinal ROS: negative for - abdominal pain or appetite loss    Objective:  Physical Exam:    Vitals:    08/28/24 1539   BP: 121/82       General appearance - alert, well appearing, and in no distress  Respiratory- No labored breathing  Breasts- Deferred to annual  Abdomen- Soft, Gravid uterus, non-tender  Extremeties: Normal ROM, Negative swelling       Counseling:   Nutrition discussed, calories, activity/exercise in pregnancy  Discussed dietary restrictions/safety food preparation in pregnancy  Reviewed what to expect prenatal visits, office providers (female and male) and covering [facility] Hospital[facility]/Dr. Treviño  Appropriate trimester precautions provided, N/V, vag bleeding, cramping  Questions answered  ANXIETY/DEPRESSION- We discussed treatment options R/B/A/SE/E expectant, THERAPY, SSRI, vs SSRI + Therapy.  Non medical options usually recommended  first.  SADIE reviewed.  Ideally weaning in third tri if possible. Some studies indicate child w increased risk Dep/Anx w SSRI use in preg.  Black box warning.  Recommend avoid abrupt discontinuation.  Discussed healthy weight gain.  Also discussed increased water intake, 200mg of caffeine daily, exercise and healthy eating habits.  Encouraged patient to consider breastfeeding. Discussed that it is recommended by the CDC and WHO that women exclusively breastfeed for the first 6 months and continue to breastfeed up to one year. Discussed the health benefits of breastfeeding, including increased immune systems in infants, reduced risk of food allergies, and reduced risk of chronic disease as an adult. Maternal benefits include more PP weight loss, reduced risk of PP depression, breast/ovarian cancer risk reduced.     ASSESSMENT/PLAN:   Diagnoses and all orders for this visit:    1. Supervision of normal first pregnancy, antepartum (Primary)  -     POC Urinalysis Dipstick  -     Urine Culture - Urine, Urine, Clean Catch  -     Urine Drug Screen - Urine, Clean Catch; Future  -     Chlamydia trachomatis, Neisseria gonorrhoeae, PCR - , Urine, Random Void  -     OB Panel With HIV and RPR  -     Hemoglobin A1c; Future  -     Hemoglobinopathy Fractionation [place]  -     Fab Panorama Prenatal Test: Chromosomes 13, 18, 21, X & Y: Triploidy 22Q.11.2 Deletion - Blood,; Future  -     Urine Drug Screen - Urine, Clean Catch  -     Hemoglobin A1c    2. Genetic screening  -     Urine Culture - Urine, Urine, Clean Catch  -     Urine Drug Screen - Urine, Clean Catch; Future  -     Chlamydia trachomatis, Neisseria gonorrhoeae, PCR - , Urine, Random Void  -     OB Panel With HIV and RPR  -     Hemoglobin A1c; Future  -     Hemoglobinopathy Fractionation [place]  -     Fab Panorama Prenatal Test: Chromosomes 13, 18, 21, X & Y: Triploidy 22Q.11.2 Deletion - Blood,; Future  -     Urine Drug Screen - Urine, Clean Catch  -     Hemoglobin  A1c    3. Encounter for other screening for genetic and chromosomal anomalies  -     Fab Vela Prenatal Test: Chromosomes 13, 18, 21, X & Y: Triploidy 22Q.11.2 Deletion - Blood,; Future    4. Slow transit constipation    5. Anxiety and depression          Return in about 4 weeks (around 9/25/2024) for Routine OB visit.    We have gone over the expected prenatal care to include the timing and content of visits including the anatomy ultrasound.  We discussed the content of the anatomy ultrasound and its limitations (the fact that ultrasound in general may not see up to 40% of abnormalities).  I informed her how to contact the office and/or on call person in the event of any problems and encouraged her to do so when she feels it is necessary.  We then spent time answering her questions which she indicated were answered to her satisfaction.    Gissel Yang DO  8/28/2024 20:03 EDT

## 2024-08-28 ENCOUNTER — INITIAL PRENATAL (OUTPATIENT)
Dept: OBSTETRICS AND GYNECOLOGY | Facility: CLINIC | Age: 19
End: 2024-08-28
Payer: COMMERCIAL

## 2024-08-28 VITALS — DIASTOLIC BLOOD PRESSURE: 82 MMHG | BODY MASS INDEX: 22.2 KG/M2 | SYSTOLIC BLOOD PRESSURE: 121 MMHG | WEIGHT: 146 LBS

## 2024-08-28 DIAGNOSIS — K59.01 SLOW TRANSIT CONSTIPATION: ICD-10-CM

## 2024-08-28 DIAGNOSIS — F32.A ANXIETY AND DEPRESSION: ICD-10-CM

## 2024-08-28 DIAGNOSIS — Z13.79 ENCOUNTER FOR OTHER SCREENING FOR GENETIC AND CHROMOSOMAL ANOMALIES: ICD-10-CM

## 2024-08-28 DIAGNOSIS — Z34.00 SUPERVISION OF NORMAL FIRST PREGNANCY, ANTEPARTUM: Primary | ICD-10-CM

## 2024-08-28 DIAGNOSIS — F41.9 ANXIETY AND DEPRESSION: ICD-10-CM

## 2024-08-28 DIAGNOSIS — Z13.79 GENETIC SCREENING: ICD-10-CM

## 2024-08-28 PROBLEM — Z34.80 SUPERVISION OF OTHER NORMAL PREGNANCY, ANTEPARTUM: Status: ACTIVE | Noted: 2024-08-28

## 2024-08-28 LAB
ABO GROUP BLD: NORMAL
BLD GP AB SCN SERPL QL: NEGATIVE
C TRACH RRNA CVX QL NAA+PROBE: NOT DETECTED
GLUCOSE UR STRIP-MCNC: NEGATIVE MG/DL
N GONORRHOEA RRNA SPEC QL NAA+PROBE: NOT DETECTED
PROT UR STRIP-MCNC: NEGATIVE MG/DL
RH BLD: NEGATIVE

## 2024-08-28 PROCEDURE — 80307 DRUG TEST PRSMV CHEM ANLYZR: CPT | Performed by: STUDENT IN AN ORGANIZED HEALTH CARE EDUCATION/TRAINING PROGRAM

## 2024-08-28 PROCEDURE — 87491 CHLMYD TRACH DNA AMP PROBE: CPT | Performed by: STUDENT IN AN ORGANIZED HEALTH CARE EDUCATION/TRAINING PROGRAM

## 2024-08-28 PROCEDURE — 87591 N.GONORRHOEAE DNA AMP PROB: CPT | Performed by: STUDENT IN AN ORGANIZED HEALTH CARE EDUCATION/TRAINING PROGRAM

## 2024-08-28 PROCEDURE — 87086 URINE CULTURE/COLONY COUNT: CPT | Performed by: STUDENT IN AN ORGANIZED HEALTH CARE EDUCATION/TRAINING PROGRAM

## 2024-08-28 PROCEDURE — 80081 OBSTETRIC PANEL INC HIV TSTG: CPT | Performed by: STUDENT IN AN ORGANIZED HEALTH CARE EDUCATION/TRAINING PROGRAM

## 2024-08-28 PROCEDURE — 83020 HEMOGLOBIN ELECTROPHORESIS: CPT | Performed by: STUDENT IN AN ORGANIZED HEALTH CARE EDUCATION/TRAINING PROGRAM

## 2024-08-28 PROCEDURE — 83036 HEMOGLOBIN GLYCOSYLATED A1C: CPT | Performed by: STUDENT IN AN ORGANIZED HEALTH CARE EDUCATION/TRAINING PROGRAM

## 2024-08-28 PROCEDURE — 86803 HEPATITIS C AB TEST: CPT | Performed by: STUDENT IN AN ORGANIZED HEALTH CARE EDUCATION/TRAINING PROGRAM

## 2024-08-29 LAB
AMPHET+METHAMPHET UR QL: NEGATIVE
BARBITURATES UR QL SCN: NEGATIVE
BASOPHILS # BLD AUTO: 0.03 10*3/MM3 (ref 0–0.2)
BASOPHILS NFR BLD AUTO: 0.3 % (ref 0–1.5)
BENZODIAZ UR QL SCN: NEGATIVE
CANNABINOIDS SERPL QL: NEGATIVE
COCAINE UR QL: NEGATIVE
DEPRECATED RDW RBC AUTO: 41.9 FL (ref 37–54)
EOSINOPHIL # BLD AUTO: 0.06 10*3/MM3 (ref 0–0.4)
EOSINOPHIL NFR BLD AUTO: 0.7 % (ref 0.3–6.2)
ERYTHROCYTE [DISTWIDTH] IN BLOOD BY AUTOMATED COUNT: 13.3 % (ref 12.3–15.4)
FENTANYL UR-MCNC: NEGATIVE NG/ML
HBA1C MFR BLD: 5.2 % (ref 4.8–5.6)
HBV SURFACE AG SERPL QL IA: NORMAL
HCT VFR BLD AUTO: 40.2 % (ref 34–46.6)
HCV AB SER QL: NORMAL
HGB A MFR BLD ELPH: 97.7 % (ref 96.4–98.8)
HGB A2 MFR BLD ELPH: 2.3 % (ref 1.8–3.2)
HGB BLD-MCNC: 13.1 G/DL (ref 12–15.9)
HGB F MFR BLD ELPH: 0 % (ref 0–2)
HGB FRACT BLD-IMP: NORMAL
HGB S MFR BLD ELPH: 0 %
HIV 1+2 AB+HIV1 P24 AG SERPL QL IA: NORMAL
IMM GRANULOCYTES # BLD AUTO: 0.04 10*3/MM3 (ref 0–0.05)
IMM GRANULOCYTES NFR BLD AUTO: 0.5 % (ref 0–0.5)
LYMPHOCYTES # BLD AUTO: 1.39 10*3/MM3 (ref 0.7–3.1)
LYMPHOCYTES NFR BLD AUTO: 16 % (ref 19.6–45.3)
MCH RBC QN AUTO: 28.4 PG (ref 26.6–33)
MCHC RBC AUTO-ENTMCNC: 32.6 G/DL (ref 31.5–35.7)
MCV RBC AUTO: 87 FL (ref 79–97)
METHADONE UR QL SCN: NEGATIVE
MONOCYTES # BLD AUTO: 0.57 10*3/MM3 (ref 0.1–0.9)
MONOCYTES NFR BLD AUTO: 6.6 % (ref 5–12)
NEUTROPHILS NFR BLD AUTO: 6.58 10*3/MM3 (ref 1.7–7)
NEUTROPHILS NFR BLD AUTO: 75.9 % (ref 42.7–76)
NRBC BLD AUTO-RTO: 0 /100 WBC (ref 0–0.2)
OPIATES UR QL: NEGATIVE
OXYCODONE UR QL SCN: NEGATIVE
PLATELET # BLD AUTO: 253 10*3/MM3 (ref 140–450)
PMV BLD AUTO: 10.3 FL (ref 6–12)
RBC # BLD AUTO: 4.62 10*6/MM3 (ref 3.77–5.28)
RPR SER QL: NORMAL
RUBV IGG SERPL IA-ACNC: 1.66 INDEX
WBC NRBC COR # BLD AUTO: 8.67 10*3/MM3 (ref 3.4–10.8)

## 2024-08-30 LAB — BACTERIA SPEC AEROBE CULT: NORMAL

## 2024-09-26 ENCOUNTER — ROUTINE PRENATAL (OUTPATIENT)
Dept: OBSTETRICS AND GYNECOLOGY | Facility: CLINIC | Age: 19
End: 2024-09-26
Payer: COMMERCIAL

## 2024-09-26 VITALS — DIASTOLIC BLOOD PRESSURE: 78 MMHG | WEIGHT: 147 LBS | SYSTOLIC BLOOD PRESSURE: 119 MMHG | BODY MASS INDEX: 22.35 KG/M2

## 2024-09-26 DIAGNOSIS — Z34.00 SUPERVISION OF NORMAL FIRST PREGNANCY, ANTEPARTUM: ICD-10-CM

## 2024-09-26 DIAGNOSIS — F32.A ANXIETY AND DEPRESSION: ICD-10-CM

## 2024-09-26 DIAGNOSIS — F41.9 ANXIETY AND DEPRESSION: ICD-10-CM

## 2024-09-26 DIAGNOSIS — Z34.80 SUPERVISION OF OTHER NORMAL PREGNANCY, ANTEPARTUM: Primary | ICD-10-CM

## 2024-09-26 DIAGNOSIS — Z13.79 ENCOUNTER FOR OTHER SCREENING FOR GENETIC AND CHROMOSOMAL ANOMALIES: ICD-10-CM

## 2024-09-26 DIAGNOSIS — R11.2 NAUSEA AND VOMITING, UNSPECIFIED VOMITING TYPE: ICD-10-CM

## 2024-09-26 DIAGNOSIS — Z13.79 GENETIC SCREENING: ICD-10-CM

## 2024-09-26 LAB
GLUCOSE UR STRIP-MCNC: NEGATIVE MG/DL
PROT UR STRIP-MCNC: NEGATIVE MG/DL

## 2024-09-26 RX ORDER — PROMETHAZINE HYDROCHLORIDE 12.5 MG/1
12.5 TABLET ORAL EVERY 6 HOURS PRN
Qty: 20 TABLET | Refills: 3 | Status: SHIPPED | OUTPATIENT
Start: 2024-09-26

## 2024-10-18 DIAGNOSIS — F41.9 ANXIETY AND DEPRESSION: ICD-10-CM

## 2024-10-18 DIAGNOSIS — F32.A ANXIETY AND DEPRESSION: ICD-10-CM

## 2024-10-18 RX ORDER — ESCITALOPRAM OXALATE 10 MG/1
10 TABLET ORAL DAILY
Qty: 90 TABLET | Refills: 1 | OUTPATIENT
Start: 2024-10-18

## 2024-10-28 RX ORDER — ONDANSETRON 4 MG/1
4 TABLET, FILM COATED ORAL EVERY 8 HOURS PRN
COMMUNITY
Start: 2024-10-03

## 2024-10-29 ENCOUNTER — ROUTINE PRENATAL (OUTPATIENT)
Dept: OBSTETRICS AND GYNECOLOGY | Facility: CLINIC | Age: 19
End: 2024-10-29
Payer: COMMERCIAL

## 2024-10-29 VITALS — SYSTOLIC BLOOD PRESSURE: 118 MMHG | WEIGHT: 150 LBS | DIASTOLIC BLOOD PRESSURE: 81 MMHG | BODY MASS INDEX: 22.81 KG/M2

## 2024-10-29 DIAGNOSIS — Z34.80 SUPERVISION OF OTHER NORMAL PREGNANCY, ANTEPARTUM: ICD-10-CM

## 2024-10-29 DIAGNOSIS — F41.9 ANXIETY AND DEPRESSION: ICD-10-CM

## 2024-10-29 DIAGNOSIS — Z34.00 SUPERVISION OF NORMAL FIRST PREGNANCY, ANTEPARTUM: Primary | ICD-10-CM

## 2024-10-29 DIAGNOSIS — F32.A ANXIETY AND DEPRESSION: ICD-10-CM

## 2024-10-29 LAB
GLUCOSE UR STRIP-MCNC: NEGATIVE MG/DL
PROT UR STRIP-MCNC: NEGATIVE MG/DL

## 2024-10-29 PROCEDURE — 87086 URINE CULTURE/COLONY COUNT: CPT | Performed by: STUDENT IN AN ORGANIZED HEALTH CARE EDUCATION/TRAINING PROGRAM

## 2024-10-29 NOTE — PROGRESS NOTES
Routine Prenatal Visit     Subjective  Washington Leonard is a 18 y.o.  at 18w2d here for her routine OB visit.   She is taking her prenatal vitamins.Reports no loss of fluid or vaginal bleeding. Patient doing well.     Pregnancy complicated by:     Patient Active Problem List   Diagnosis    Syncope and collapse    Palpitations    Anxiety and depression    Gastroesophageal reflux disease    Supervision of other normal pregnancy, antepartum         OB History    Para Term  AB Living   1 0 0 0 0 0   SAB IAB Ectopic Molar Multiple Live Births   0 0 0 0 0 0      # Outcome Date GA Lbr Ben/2nd Weight Sex Type Anes PTL Lv   1 Current                    ROS:  General ROS: negative for - chills or fatigue  Genito-Urinary ROS: negative for  change in urinary stream, vaginal discharge     Objective  Physical Exam:   Vitals:    10/29/24 1525   BP: 118/81       Uterine Size: size equals dates  FHT: 110-160 BPM    General appearance - alert, well appearing, and in no distress  Abdomen- Soft, Gravid uterus, non-tender to palpation  Extremeties: negative swelling       Assessment/Plan:   Diagnoses and all orders for this visit:    1. Supervision of normal first pregnancy, antepartum (Primary)  -     POC Urinalysis Dipstick  -     Urine Culture - Urine, Urine, Clean Catch  -     US Ob 14 + Weeks Single or First Gestation; Future    2. Supervision of other normal pregnancy, antepartum    3. Anxiety and depression            Counseling:   Second trimester precautions  Round Ligament Pain:  The uterus has several ligaments which provide support and keep the uterus in place. As the  uterus grows these ligaments are pulled and stretched which often causes sharp stabbing like pain in the inguinal area.   You may find a pregnancy support band helpful. Changing positions may also help. Yoga is a great way to cope with round ligament and low back pain in pregnancy.    Massage may also help with low back pain    Things to Consider at this Point in your Pregnancy:  Some women experience swelling in their feet during pregnancy. Compression stockings may help  Drink plenty of water and stay active   Make sure you are eating frequent small meals, nuts are a wonderful snack to keep with you            Return in about 3 weeks (around 11/19/2024) for Routine OB visit.      We have gone over prenatal care to include the timing and content of visits. I informed her how to contact the office and/or on call person in the event of any problems and encouraged her to do so when she feels it is necessary.  We then spent time answering her questions which she indicated were answered to her satisfaction.    Gissel Yang,   10/29/2024 17:36 EDT

## 2024-10-30 LAB — BACTERIA SPEC AEROBE CULT: NORMAL

## 2024-11-18 NOTE — PROGRESS NOTES
Routine Prenatal Visit     Subjective  Washington Leonard is a 18 y.o.  at 21w3d here for her routine OB visit.   She is taking her prenatal vitamins.Reports no loss of fluid or vaginal bleeding. Patient doing well.     Pregnancy is complicated by:     Patient Active Problem List   Diagnosis    Syncope and collapse    Palpitations    Anxiety and depression    Gastroesophageal reflux disease    Supervision of other normal pregnancy, antepartum         OB History    Para Term  AB Living   1 0 0 0 0 0   SAB IAB Ectopic Molar Multiple Live Births   0 0 0 0 0 0      # Outcome Date GA Lbr Ben/2nd Weight Sex Type Anes PTL Lv   1 Current                    ROS:   General ROS: negative for - chills or fatigue  Genito-Urinary ROS: negative for  change in urinary stream, vaginal discharge     Objective  Physical Exam:   Vitals:    24 1156   BP: 110/68       Uterine Size: not examined, US today  FHT: 110-160 BPM    General appearance - alert, well appearing, and in no distress  Abdomen- Soft, Gravid uterus, non-tender to palpation  Extremeties: negative swelling       Assessment/Plan:   Diagnoses and all orders for this visit:    1. Supervision of other normal pregnancy, antepartum (Primary)  Assessment & Plan:  STEPHANIE finalized: 3/30/2025 by US and ACOG    Genetic testing (NIPS-Quad)/CF/AFP:  NIPS neg XX    COVID: recommended  Flu: recommended  Tdap: 27-36 weeks  RSV: 32-36 weeks    Repeat syphilis screen at 24-28 weeks  1hr gtt:   Rhogam: A neg, will need rhogam  ?Sterilization:    Anatomy US:  EFW 70%, AC 61%, breech, posterior grade 1 placenta, limited normal anatomy, female, suboptimal heart and spine views  FU US: ordered    PROBLEM LIST/PLAN:   ?Umbilical cord cyst noted on dating US, repeat ultrasound 24 did not show cyst, not seen on anatomy US  Anxiety/Depression- stable on lexapro 10mg  Syncope and palpitations- no meds currently, previously on metoprolol and followed with  cardio  RH-    Orders:  -     US Ob 14 + Weeks Single or First Gestation; Future    2. Supervision of normal first pregnancy, antepartum  -     POC Urinalysis Dipstick          Counseling:   Second trimester precautions  Round Ligament Pain:  The uterus has several ligaments which provide support and keep the uterus in place. As the  uterus grows these ligaments are pulled and stretched which often causes sharp stabbing like pain in the inguinal area.   You may find a pregnancy support band helpful. Changing positions may also help. Yoga is a great way to cope with round ligament and low back pain in pregnancy.    Massage may also help with low back pain   Things to Consider at this Point in your Pregnancy:  Some women experience swelling in their feet during pregnancy. Compression stockings may help  Drink plenty of water and stay active   Make sure you are eating frequent small meals, nuts are a wonderful snack to keep with you            Return in about 4 weeks (around 12/18/2024) for Routine OB visit.      We have gone over prenatal care to include the timing and content of visits. I informed her how to contact the office and/or on call person in the event of any problems and encouraged her to do so when she feels it is necessary.  We then spent time answering her questions which she indicated were answered to her satisfaction.    Gissel Yang,   11/20/2024 12:14 EST

## 2024-11-20 ENCOUNTER — ROUTINE PRENATAL (OUTPATIENT)
Dept: OBSTETRICS AND GYNECOLOGY | Facility: CLINIC | Age: 19
End: 2024-11-20
Payer: COMMERCIAL

## 2024-11-20 VITALS — SYSTOLIC BLOOD PRESSURE: 110 MMHG | DIASTOLIC BLOOD PRESSURE: 68 MMHG | WEIGHT: 152 LBS | BODY MASS INDEX: 23.11 KG/M2

## 2024-11-20 DIAGNOSIS — Z34.00 SUPERVISION OF NORMAL FIRST PREGNANCY, ANTEPARTUM: ICD-10-CM

## 2024-11-20 DIAGNOSIS — Z34.80 SUPERVISION OF OTHER NORMAL PREGNANCY, ANTEPARTUM: Primary | ICD-10-CM

## 2024-11-20 LAB
GLUCOSE UR STRIP-MCNC: NEGATIVE MG/DL
PROT UR STRIP-MCNC: NEGATIVE MG/DL

## 2024-11-20 NOTE — ASSESSMENT & PLAN NOTE
STEPHANIE finalized: 3/30/2025 by US and ACOG    Genetic testing (NIPS-Quad)/CF/AFP:  NIPS neg XX    COVID: recommended  Flu: recommended  Tdap: 27-36 weeks  RSV: 32-36 weeks    Repeat syphilis screen at 24-28 weeks  1hr gtt:   Rhogam: A neg, will need rhogam  ?Sterilization:    Anatomy US: 11/20 EFW 70%, AC 61%, breech, posterior grade 1 placenta, limited normal anatomy, female, suboptimal heart and spine views  FU US: ordered    PROBLEM LIST/PLAN:   ?Umbilical cord cyst noted on dating US, repeat ultrasound 09/26/24 did not show cyst, not seen on anatomy US  Anxiety/Depression- stable on lexapro 10mg  Syncope and palpitations- no meds currently, previously on metoprolol and followed with cardio  RH-

## 2024-12-17 NOTE — PROGRESS NOTES
Routine Prenatal Visit     Subjective  Washington Leonard is a 19 y.o.  at 25w3d here for her routine OB visit.   She is taking her prenatal vitamins.Reports no loss of fluid or vaginal bleeding. Patient doing ok. States she is have shortness of breath most of the time. States she had asthma as a child. Doesn't have an inhaler     Pregnancy is complicated by:     Patient Active Problem List   Diagnosis    Syncope and collapse    Palpitations    Anxiety and depression    Gastroesophageal reflux disease    Supervision of other normal pregnancy, antepartum         OB History    Para Term  AB Living   1 0 0 0 0 0   SAB IAB Ectopic Molar Multiple Live Births   0 0 0 0 0 0      # Outcome Date GA Lbr Ben/2nd Weight Sex Type Anes PTL Lv   1 Current                    ROS:    General ROS: negative for - chills or fatigue  Genito-Urinary ROS: negative for  change in urinary stream, vaginal discharge     Objective  Physical Exam:    Vitals:    24 1456   BP: 118/78       Uterine Size: not examined, US today  FHT: 110-160 BPM     General appearance - alert, well appearing, and in no distress  Abdomen- Soft, Gravid uterus, non-tender to palpation  Extremeties: negative swelling       Assessment/Plan:   Diagnoses and all orders for this visit:    1. Supervision of other normal pregnancy, antepartum (Primary)  Assessment & Plan:  STEPHANIE finalized: 3/30/2025 by US and ACOG    Genetic testing (NIPS-Quad)/CF/AFP:  NIPS neg XX    COVID: recommended  Flu: recommended  Tdap: 27-36 weeks  RSV: 32-36 weeks    Repeat syphilis screen at 24-28 weeks  1hr gtt:   Rhogam: A neg, will need rhogam  ?Sterilization:    Anatomy US:  EFW 70%, AC 61%, breech, posterior grade 1 placenta, limited normal anatomy, female, suboptimal heart and spine views  FU US:  EFW 43%, AC 28% cephalic, posterior grade 1 placenta, follow-up anatomy WNL    PROBLEM LIST/PLAN:   ?Umbilical cord cyst noted on dating US, repeat  ultrasound 09/26/24 did not show cyst, not seen on anatomy US  Anxiety/Depression- stable on lexapro 10mg  Syncope and palpitations- no meds currently, previously on metoprolol and followed with cardio  RH-      2. Supervision of normal first pregnancy, antepartum  -     POC Urinalysis Dipstick  -     Gestational Diabetes Screen 1 Hour; Future  -     CBC (No Diff); Future  -     RPR, Rfx Qn RPR / Confirm TP  -     Gestational Diabetes Screen 1 Hour  -     CBC (No Diff)    3. Shortness of breath  -     albuterol sulfate  (90 Base) MCG/ACT inhaler; Inhale 2 puffs Every 4 (Four) Hours As Needed for Wheezing.  Dispense: 90 g; Refill: 0    4. Anxiety and depression          Counseling:   OB precautions, leaking, VB, josé yu vs PTL/Labor  FKC  Round Ligament Pain:  The uterus has several ligaments which provide support and keep the uterus in place. As the  uterus grows these ligaments are pulled and stretched which often causes sharp stabbing like pain in the inguinal area.   You may find a pregnancy support band helpful. Changing positions may also help. Yoga is a great way to cope with round ligament and low back pain in pregnancy.    Massage may also help with low back pain   Things to Consider at this Point in your Pregnancy:  Some women experience swelling in their feet during pregnancy. Compression stockings may help  Drink plenty of water and stay active   Make sure you are eating frequent small meals, nuts are a wonderful snack to keep with you            Return in about 3 weeks (around 1/8/2025) for Routine OB visit.      We have gone over prenatal care to include the timing and content of visits. I informed her how to contact the office and/or on call person in the event of any problems and encouraged her to do so when she feels it is necessary.  We then spent time answering her questions which she indicated were answered to her satisfaction.    Gissel Yang,   12/18/2024 21:05 EST

## 2024-12-18 ENCOUNTER — ROUTINE PRENATAL (OUTPATIENT)
Dept: OBSTETRICS AND GYNECOLOGY | Facility: CLINIC | Age: 19
End: 2024-12-18
Payer: COMMERCIAL

## 2024-12-18 VITALS — BODY MASS INDEX: 24.18 KG/M2 | SYSTOLIC BLOOD PRESSURE: 118 MMHG | DIASTOLIC BLOOD PRESSURE: 78 MMHG | WEIGHT: 159 LBS

## 2024-12-18 DIAGNOSIS — Z34.00 SUPERVISION OF NORMAL FIRST PREGNANCY, ANTEPARTUM: ICD-10-CM

## 2024-12-18 DIAGNOSIS — F41.9 ANXIETY AND DEPRESSION: ICD-10-CM

## 2024-12-18 DIAGNOSIS — Z34.80 SUPERVISION OF OTHER NORMAL PREGNANCY, ANTEPARTUM: Primary | ICD-10-CM

## 2024-12-18 DIAGNOSIS — R06.02 SHORTNESS OF BREATH: ICD-10-CM

## 2024-12-18 DIAGNOSIS — F32.A ANXIETY AND DEPRESSION: ICD-10-CM

## 2024-12-18 LAB
DEPRECATED RDW RBC AUTO: 45.1 FL (ref 37–54)
ERYTHROCYTE [DISTWIDTH] IN BLOOD BY AUTOMATED COUNT: 13.6 % (ref 12.3–15.4)
GLUCOSE 1H P GLC SERPL-MCNC: 95 MG/DL (ref 65–139)
HCT VFR BLD AUTO: 34.5 % (ref 34–46.6)
HGB BLD-MCNC: 11.5 G/DL (ref 12–15.9)
MCH RBC QN AUTO: 30 PG (ref 26.6–33)
MCHC RBC AUTO-ENTMCNC: 33.3 G/DL (ref 31.5–35.7)
MCV RBC AUTO: 90.1 FL (ref 79–97)
PLATELET # BLD AUTO: 227 10*3/MM3 (ref 140–450)
PMV BLD AUTO: 9.9 FL (ref 6–12)
RBC # BLD AUTO: 3.83 10*6/MM3 (ref 3.77–5.28)
WBC NRBC COR # BLD AUTO: 12.94 10*3/MM3 (ref 3.4–10.8)

## 2024-12-18 PROCEDURE — 82950 GLUCOSE TEST: CPT | Performed by: STUDENT IN AN ORGANIZED HEALTH CARE EDUCATION/TRAINING PROGRAM

## 2024-12-18 PROCEDURE — 86592 SYPHILIS TEST NON-TREP QUAL: CPT | Performed by: STUDENT IN AN ORGANIZED HEALTH CARE EDUCATION/TRAINING PROGRAM

## 2024-12-18 PROCEDURE — 85027 COMPLETE CBC AUTOMATED: CPT | Performed by: STUDENT IN AN ORGANIZED HEALTH CARE EDUCATION/TRAINING PROGRAM

## 2024-12-18 RX ORDER — ALBUTEROL SULFATE 90 UG/1
2 INHALANT RESPIRATORY (INHALATION) EVERY 4 HOURS PRN
Qty: 90 G | Refills: 0 | Status: SHIPPED | OUTPATIENT
Start: 2024-12-18

## 2024-12-19 NOTE — ASSESSMENT & PLAN NOTE
STEPHANIE finalized: 3/30/2025 by US and ACOG    Genetic testing (NIPS-Quad)/CF/AFP:  NIPS neg XX    COVID: recommended  Flu: recommended  Tdap: 27-36 weeks  RSV: 32-36 weeks    Repeat syphilis screen at 24-28 weeks  1hr gtt:   Rhogam: A neg, will need rhogam  ?Sterilization:    Anatomy US: 11/20 EFW 70%, AC 61%, breech, posterior grade 1 placenta, limited normal anatomy, female, suboptimal heart and spine views  FU US: 12/18 EFW 43%, AC 28% cephalic, posterior grade 1 placenta, follow-up anatomy WNL    PROBLEM LIST/PLAN:   ?Umbilical cord cyst noted on dating US, repeat ultrasound 09/26/24 did not show cyst, not seen on anatomy US  Anxiety/Depression- stable on lexapro 10mg  Syncope and palpitations- no meds currently, previously on metoprolol and followed with cardio  RH-

## 2024-12-20 LAB — RPR SER QL: NON REACTIVE

## 2025-01-03 ENCOUNTER — TELEPHONE (OUTPATIENT)
Dept: OBSTETRICS AND GYNECOLOGY | Facility: CLINIC | Age: 20
End: 2025-01-03

## 2025-01-03 NOTE — TELEPHONE ENCOUNTER
Provider: DR. MENDEZ    Caller: Washington Leonard    Relationship to Patient: Self    Pharmacy:     Phone Number: [phone]    Reason for Call: PRESCRIPTION FOR BREAST PUMP    When was the patient last seen: 12.18.24    PATIENT CALLING IN AND WOULD LIKE TO KNOW IF DR. MENDEZ RECEIVED THE FAX FROM [facility] FOR THE BREAST PUMP    PATIENT CAN BE REACHED [phone]    PATIENT SENT A MESSAGE ON 11.29.24 ASKING ABOUT THIS     THANK YOU

## 2025-01-10 NOTE — PROGRESS NOTES
Routine Prenatal Visit     Subjective  Washington Leonard is a 19 y.o.  at 29w2d here for her routine OB visit.   She is taking her prenatal vitamins.Reports no loss of fluid or vaginal bleeding. Patient doing well.     Pregnancy is complicated by:     Patient Active Problem List   Diagnosis    Syncope and collapse    Palpitations    Anxiety and depression    Gastroesophageal reflux disease    Supervision of other normal pregnancy, antepartum         OB History    Para Term  AB Living   1 0 0 0 0 0   SAB IAB Ectopic Molar Multiple Live Births   0 0 0 0 0 0      # Outcome Date GA Lbr Ben/2nd Weight Sex Type Anes PTL Lv   1 Current                    ROS:   General ROS: negative for - chills or fatigue  Genito-Urinary ROS: negative for  change in urinary stream, vaginal discharge     Objective  Physical Exam:   Vitals:    25 1513   BP: 116/77       Uterine Size: size equals dates  FHT: 110-160 BPM        Assessment/Plan:   Diagnoses and all orders for this visit:    1. Supervision of other normal pregnancy, antepartum (Primary)  Assessment & Plan:  STEPHANIE finalized: 3/30/2025 by US and ACOG    Genetic testing (NIPS-Quad)/CF/AFP:  NIPS neg XX    COVID: recommended  Flu: recommended  Tdap: 27-36 weeks  RSV: 32-36 weeks    Repeat syphilis screen at 24-28 weeks  1hr gtt:   Rhogam: A neg, rhogam given 25  ?Sterilization:    Anatomy US:  EFW 70%, AC 61%, breech, posterior grade 1 placenta, limited normal anatomy, female, suboptimal heart and spine views  FU US:  EFW 43%, AC 28% cephalic, posterior grade 1 placenta, follow-up anatomy WNL    PROBLEM LIST/PLAN:   ?Umbilical cord cyst noted on dating US, repeat ultrasound 24 did not show cyst, not seen on anatomy US  Anxiety/Depression- stable on lexapro 10mg  Syncope and palpitations- no meds currently, previously on metoprolol and followed with cardio  RH-    Orders:  -     POC Urinalysis Dipstick  -      RhIg  Evaluation; Future  -     Doses of rh immune globulin; Future  -     Rhogam Immune Globulin Immunization  -     Doses of rh immune globulin          Counseling:   OB precautions, leaking, VB, josé yu vs PTL/Labor  FKC  Round Ligament Pain:  The uterus has several ligaments which provide support and keep the uterus in place. As the  uterus grows these ligaments are pulled and stretched which often causes sharp stabbing like pain in the inguinal area.   You may find a pregnancy support band helpful. Changing positions may also help. Yoga is a great way to cope with round ligament and low back pain in pregnancy.    Massage may also help with low back pain   Things to Consider at this Point in your Pregnancy:  Some women experience swelling in their feet during pregnancy. Compression stockings may help  Drink plenty of water and stay active   Make sure you are eating frequent small meals, nuts are a wonderful snack to keep with you            Return in about 3 weeks (around 2/4/2025) for Routine OB visit.      We have gone over prenatal care to include the timing and content of visits. I informed her how to contact the office and/or on call person in the event of any problems and encouraged her to do so when she feels it is necessary.  We then spent time answering her questions which she indicated were answered to her satisfaction.    Gissel Yang,   1/14/2025 16:03 EST

## 2025-01-14 ENCOUNTER — ROUTINE PRENATAL (OUTPATIENT)
Dept: OBSTETRICS AND GYNECOLOGY | Facility: CLINIC | Age: 20
End: 2025-01-14
Payer: COMMERCIAL

## 2025-01-14 VITALS — DIASTOLIC BLOOD PRESSURE: 77 MMHG | SYSTOLIC BLOOD PRESSURE: 116 MMHG | WEIGHT: 166 LBS | BODY MASS INDEX: 25.24 KG/M2

## 2025-01-14 DIAGNOSIS — Z34.80 SUPERVISION OF OTHER NORMAL PREGNANCY, ANTEPARTUM: Primary | ICD-10-CM

## 2025-01-14 LAB
ABO GROUP BLD: NORMAL
BLD GP AB SCN SERPL QL: NEGATIVE
GLUCOSE UR STRIP-MCNC: NEGATIVE MG/DL
NUMBER OF DOSES: ABNORMAL
PROT UR STRIP-MCNC: NEGATIVE MG/DL
RH BLD: NEGATIVE

## 2025-01-14 PROCEDURE — 86900 BLOOD TYPING SEROLOGIC ABO: CPT | Performed by: STUDENT IN AN ORGANIZED HEALTH CARE EDUCATION/TRAINING PROGRAM

## 2025-01-14 PROCEDURE — 86850 RBC ANTIBODY SCREEN: CPT | Performed by: STUDENT IN AN ORGANIZED HEALTH CARE EDUCATION/TRAINING PROGRAM

## 2025-01-14 PROCEDURE — 86901 BLOOD TYPING SEROLOGIC RH(D): CPT | Performed by: STUDENT IN AN ORGANIZED HEALTH CARE EDUCATION/TRAINING PROGRAM

## 2025-01-14 NOTE — ASSESSMENT & PLAN NOTE
STEPHANIE finalized: 3/30/2025 by US and ACOG    Genetic testing (NIPS-Quad)/CF/AFP:  NIPS neg XX    COVID: recommended  Flu: recommended  Tdap: 27-36 weeks  RSV: 32-36 weeks    Repeat syphilis screen at 24-28 weeks  1hr gtt:   Rhogam: A neg, rhogam given 01/14/25  ?Sterilization:    Anatomy US: 11/20 EFW 70%, AC 61%, breech, posterior grade 1 placenta, limited normal anatomy, female, suboptimal heart and spine views  FU US: 12/18 EFW 43%, AC 28% cephalic, posterior grade 1 placenta, follow-up anatomy WNL    PROBLEM LIST/PLAN:   ?Umbilical cord cyst noted on dating US, repeat ultrasound 09/26/24 did not show cyst, not seen on anatomy US  Anxiety/Depression- stable on lexapro 10mg  Syncope and palpitations- no meds currently, previously on metoprolol and followed with cardio  RH-

## 2025-01-16 DIAGNOSIS — F41.9 ANXIETY AND DEPRESSION: ICD-10-CM

## 2025-01-16 DIAGNOSIS — F32.A ANXIETY AND DEPRESSION: ICD-10-CM

## 2025-01-20 ENCOUNTER — OFFICE VISIT (OUTPATIENT)
Dept: CARDIOLOGY | Facility: CLINIC | Age: 20
End: 2025-01-20
Payer: COMMERCIAL

## 2025-01-20 ENCOUNTER — TELEPHONE (OUTPATIENT)
Dept: OBSTETRICS AND GYNECOLOGY | Facility: CLINIC | Age: 20
End: 2025-01-20
Payer: COMMERCIAL

## 2025-01-20 VITALS
HEIGHT: 68 IN | WEIGHT: 168 LBS | BODY MASS INDEX: 25.46 KG/M2 | HEART RATE: 120 BPM | DIASTOLIC BLOOD PRESSURE: 84 MMHG | SYSTOLIC BLOOD PRESSURE: 133 MMHG

## 2025-01-20 DIAGNOSIS — R00.2 PALPITATIONS: Primary | ICD-10-CM

## 2025-01-20 PROCEDURE — 1159F MED LIST DOCD IN RCRD: CPT | Performed by: INTERNAL MEDICINE

## 2025-01-20 PROCEDURE — 1160F RVW MEDS BY RX/DR IN RCRD: CPT | Performed by: INTERNAL MEDICINE

## 2025-01-20 PROCEDURE — 99213 OFFICE O/P EST LOW 20 MIN: CPT | Performed by: INTERNAL MEDICINE

## 2025-01-20 RX ORDER — LABETALOL 100 MG/1
50 TABLET, FILM COATED ORAL 2 TIMES DAILY PRN
Qty: 30 TABLET | Refills: 3 | Status: SHIPPED | OUTPATIENT
Start: 2025-01-20

## 2025-01-20 RX ORDER — ESCITALOPRAM OXALATE 10 MG/1
10 TABLET ORAL DAILY
Qty: 90 TABLET | Refills: 0 | OUTPATIENT
Start: 2025-01-20

## 2025-01-20 NOTE — TELEPHONE ENCOUNTER
Noted, as patient is now in third trimester recommend reaching out to her OB/GYN to discuss, they may potentially want to switch medication or decrease dose.

## 2025-01-20 NOTE — ASSESSMENT & PLAN NOTE
Previous Holter monitor study showed inappropriate sinus tachycardia.  She was on metoprolol, which was discontinued at the beginning of pregnancy.  She did well initially, however for the past few weeks she she gets frequent palpitations and intermittent dizziness.  Heart rate is mostly above 100 in the afternoons.  The options were discussed in detail with the patient.  We will give prescription for labetalol 50 mg to be taken as needed for increasing symptoms or heart rate above 110 at rest.  This is the safest beta-blocker in pregnancy.  Will follow-up after delivery to decide regarding reinitiation of metoprolol.

## 2025-01-20 NOTE — TELEPHONE ENCOUNTER
Patient currently 30 weeks pregnant, review of recent office notes with OB show that patient is not actively taking this medication

## 2025-01-20 NOTE — TELEPHONE ENCOUNTER
Patient called this pm.  She is requesting a refill on her Lexapro.  Last filled 7/10/24 Lexapro # 90 with 1 refill by Marisabel Philippe.  She contacted Ms Philippe office and they told her since she was pregnant they would feel better her getting that filled by her Ob doctor.   She is currently 30 weeks.  Last seen 1/14/25.  Next appointment 2/6/25

## 2025-01-20 NOTE — PROGRESS NOTES
CARDIOLOGY FOLLOW-UP PROGRESS NOTE        Chief Complaint  Follow-up and Palpitations    Subjective            Sha'Danielle Leonard presents to [facility] CARDIOLOGY  History of Present Illness      Ms. Leonard is here for a follow-up visit.  She was previously seen and evaluated for palpitation, dizziness and presyncopal episodes.  Tilt table study and echocardiogram are unremarkable.  Holter monitor study showed inappropriate sinus tachycardia.  She was started on metoprolol for symptomatic benefits which helped with palpitations.  She is currently 30 weeks pregnant.  Metoprolol discontinued at the beginning of pregnancy.  Overall she did well until recently.  She has significant palpitations and intermittent dizziness at this time.  Symptoms are worse in the afternoons.  No syncopal episodes.  No chest pain.  She does report some shortness of breath with activity.    Past History:    Medical History:  Past Medical History:   Diagnosis Date    Anemia     Anxiety     Asthma 2018    Chlamydia     Depression     Heart murmur     Migraine     Syncope     Urinary tract infection        Family History: family history includes Anxiety disorder in her mother and sister; Arrhythmia in her paternal grandmother; Asthma in her brother; Breast cancer in her maternal aunt; Cirrhosis in her maternal grandmother; Coronary artery disease in her paternal grandfather; Depression in her mother; Diabetes in her maternal grandmother and paternal grandmother; Heart disease in her maternal grandfather; Hypertension in her maternal grandmother and paternal grandfather; Liver disease in her maternal grandmother; Miscarriages / Stillbirths in her mother; Pulmonary embolism in her paternal grandfather; Vision loss in her maternal grandmother.     Social History: reports that she has never smoked. She has never been exposed to tobacco smoke. She has never used smokeless tobacco. She reports that she does not drink  "alcohol and does not use drugs.    Allergies: Patient has no known allergies.    Current Outpatient Medications on File Prior to Visit   Medication Sig    albuterol sulfate  (90 Base) MCG/ACT inhaler Inhale 2 puffs Every 4 (Four) Hours As Needed for Wheezing.    escitalopram (LEXAPRO) 10 MG tablet Take 1 tablet by mouth Daily.    loratadine (CLARITIN) 10 MG tablet Take 1 tablet by mouth Daily.    ondansetron (ZOFRAN) 4 MG tablet Take 1 tablet by mouth Every 8 (Eight) Hours As Needed for Nausea or Vomiting.    Prenatal MV-Min-Fe Fum-FA-DHA (PRENATAL 1 PO) Take  by mouth.       Review of Systems   Respiratory:  Negative for cough, shortness of breath and wheezing.    Cardiovascular:  Positive for palpitations. Negative for chest pain and leg swelling.   Gastrointestinal:  Negative for nausea and vomiting.   Neurological:  Positive for dizziness. Negative for syncope.        Objective     /84   Pulse 120   Ht 172.7 cm (68\")   Wt 76.2 kg (168 lb)   BMI 25.54 kg/m²       Physical Exam    General : Alert, awake, no acute distress  Neck : Supple, no carotid bruit, no jugular venous distention  CVS : Tachycardic, regular, no murmur, rubs or gallops  Lungs: Clear to auscultation bilaterally, no crackles or rhonchi  Abdomen: Soft, nontender, bowel sounds heard in all 4 quadrants  Extremities: Warm, well-perfused, no pedal edema    Result Review :     The following data was reviewed by: Avila Zelaya MD on 01/20/2025:    CMP          7/31/2024    21:16   CMP   Glucose 94    BUN 8    Creatinine 0.58    EGFR 133.9    Sodium 137    Potassium 4.0    Chloride 102    Calcium 9.3    Total Protein 7.1    Albumin 4.2    Globulin 2.9    Total Bilirubin 0.2    Alkaline Phosphatase 93    AST (SGOT) 18    ALT (SGPT) 17    Albumin/Globulin Ratio 1.4    BUN/Creatinine Ratio 13.8    Anion Gap 10.8      CBC          7/31/2024    21:16 8/28/2024    16:25 12/18/2024    16:02   CBC   WBC 8.56  8.67  12.94    RBC 4.56  4.62  " 3.83    Hemoglobin 12.9  13.1  11.5    Hematocrit 38.7  40.2  34.5    MCV 84.9  87.0  90.1    MCH 28.3  28.4  30.0    MCHC 33.3  32.6  33.3    RDW 13.2  13.3  13.6    Platelets 255  253  227          Data reviewed: Cardiology studies        Results for orders placed in visit on 09/06/23    Adult Transthoracic Echo Complete W/ Cont if Necessary Per Protocol    Interpretation Summary    Left ventricular systolic function is normal. Left ventricular ejection fraction appears to be 61 - 65%.    Left ventricular diastolic function is normal.    There are no significant valvular abnormalities.    Estimated right ventricular systolic pressure from tricuspid regurgitation is normal (<35 mmHg).                   Assessment and Plan        Diagnoses and all orders for this visit:    1. Palpitations (Primary)  Assessment & Plan:  Previous Holter monitor study showed inappropriate sinus tachycardia.  She was on metoprolol, which was discontinued at the beginning of pregnancy.  She did well initially, however for the past few weeks she she gets frequent palpitations and intermittent dizziness.  Heart rate is mostly above 100 in the afternoons.  The options were discussed in detail with the patient.  We will give prescription for labetalol 50 mg to be taken as needed for increasing symptoms or heart rate above 110 at rest.  This is the safest beta-blocker in pregnancy.  Will follow-up after delivery to decide regarding reinitiation of metoprolol.    Orders:  -     labetalol (NORMODYNE) 100 MG tablet; Take 0.5 tablets by mouth 2 (Two) Times a Day As Needed (Palpitations).  Dispense: 30 tablet; Refill: 3              Follow Up     Return in about 3 months (around 5/1/2025) for Next scheduled follow up, with Kristie ULLOA.    Patient was given instructions and counseling regarding her condition or for health maintenance advice. Please see specific information pulled into the AVS if appropriate.

## 2025-01-21 DIAGNOSIS — F41.9 ANXIETY AND DEPRESSION: ICD-10-CM

## 2025-01-21 DIAGNOSIS — F32.A ANXIETY AND DEPRESSION: ICD-10-CM

## 2025-01-21 RX ORDER — ESCITALOPRAM OXALATE 10 MG/1
10 TABLET ORAL DAILY
Qty: 90 TABLET | Refills: 1 | Status: SHIPPED | OUTPATIENT
Start: 2025-01-21

## 2025-01-24 ENCOUNTER — TELEPHONE (OUTPATIENT)
Dept: OBSTETRICS AND GYNECOLOGY | Facility: CLINIC | Age: 20
End: 2025-01-24
Payer: COMMERCIAL

## 2025-01-24 NOTE — TELEPHONE ENCOUNTER
1/24/2025 LVM FOR PATIENT TO RETURN CALL TO RESCHEDULE 2/6/2025 OB FOLLOW UP WITH DR MENDEZ SINCE HER SCHEDULE HAS CHANGED.

## 2025-01-29 ENCOUNTER — TELEPHONE (OUTPATIENT)
Dept: OBSTETRICS AND GYNECOLOGY | Facility: CLINIC | Age: 20
End: 2025-01-29

## 2025-01-29 NOTE — TELEPHONE ENCOUNTER
Hub staff attempted to follow warm transfer process and was unsuccessful     Caller: Washington Leonard    Relationship to patient: Self    Best call back number: [phone] (home)       Patient RETURNING PHONE CALL FROM FRIDAY THAT APPT NEEDS TO BE RESCHEDULED AS PROVIDER NOT IN OFFICE. PATIENT IS OK WITH ANYTHING EXCEPT WEEK OF 2/14/25.

## 2025-02-07 ENCOUNTER — HOSPITAL ENCOUNTER (OUTPATIENT)
Facility: HOSPITAL | Age: 20
Discharge: HOME | End: 2025-02-07
Attending: OBSTETRICS & GYNECOLOGY | Admitting: OBSTETRICS & GYNECOLOGY
Payer: COMMERCIAL

## 2025-02-07 ENCOUNTER — TELEPHONE (OUTPATIENT)
Dept: OBSTETRICS AND GYNECOLOGY | Facility: CLINIC | Age: 20
End: 2025-02-07
Payer: COMMERCIAL

## 2025-02-07 VITALS
DIASTOLIC BLOOD PRESSURE: 83 MMHG | HEART RATE: 117 BPM | OXYGEN SATURATION: 98 % | SYSTOLIC BLOOD PRESSURE: 132 MMHG | RESPIRATION RATE: 18 BRPM

## 2025-02-07 PROBLEM — O26.853 SPOTTING AFFECTING PREGNANCY IN THIRD TRIMESTER: Status: ACTIVE | Noted: 2025-02-07

## 2025-02-07 PROCEDURE — G0463 HOSPITAL OUTPT CLINIC VISIT: HCPCS

## 2025-02-07 NOTE — H&P
ANAI Mckinney  Obstetric History and Physical    No chief complaint on file.      Subjective     HPI:    Patient is a 19 y.o. female  currently at 32w5d, who presents with spotting x1 episode on waking.  She denies abdominal pain or cramping, leaking fluid or continued vaginal bleeding.  She reports good fetal movement but denies additional bleeding in triage.    PNC provided by:  ANAI. Her prenatal care is complicated by   Patient Active Problem List   Diagnosis    Syncope and collapse    Palpitations    Anxiety and depression    Gastroesophageal reflux disease    Supervision of other normal pregnancy, antepartum    Spotting affecting pregnancy in third trimester         Her previous obstetric/gynecological history is noted for   .    The following portions of the patients history were reviewed and updated as appropriate:   current medications, allergies, past medical history, past surgical history, past family history, past social history and current problem list.     Prenatal Information:  Prenatal Results       Initial Prenatal Labs       Test Value Reference Range Date Time    Hemoglobin  13.1 g/dL 12.0 - 15.9 24 1625       12.9 g/dL 12.0 - 15.9 24 2116    Hematocrit  40.2 % 34.0 - 46.6 24 1625       38.7 % 34.0 - 46.6 24 2116    Platelets  253 10*3/mm3 140 - 450 24 1625       255 10*3/mm3 140 - 450 24 2116    Rubella IgG  1.66 index Immune >0.99 24 1625    Hepatitis B SAg  Non-Reactive  Non-Reactive 24 1625    Hepatitis C Ab  Non-Reactive  Non-Reactive 24 1625    RPR  Non Reactive  Non Reactive 24 1602       Non-Reactive  Non-Reactive 24 1625    T. Pallidum Ab         ABO  A   25 1547    Rh  Negative   25 1547    Antibody Screen  Negative   24 1625    HIV  Non-Reactive  Non-Reactive 24 1625    Urine Culture  <25,000 CFU/mL Normal Urogenital Hayley   10/29/24 1621       >100,000 CFU/mL Mixed Hayley Isolated   24  1620    Gonorrhea  Not Detected  Not Detected  08/28/24 1620    Chlamydia  Not Detected  Not Detected  08/28/24 1620    TSH        HgB A1c   5.20 % 4.80 - 5.60 08/28/24 1625    Varicella IgG        Hemoglobinopathy Fractionation  Comment   08/28/24 1625    Hemoglobinopathy (genetic testing)        Cystic fibrosis         Spinal muscular atrophy        Fragile X                  Fetal testing        Test Value Reference Range Date Time    NIPT        MSAFP        AFP-4                  2nd and 3rd Trimester       Test Value Reference Range Date Time    Hemoglobin (repeated)  11.5 g/dL 12.0 - 15.9 12/18/24 1602    Hematocrit (repeated)  34.5 % 34.0 - 46.6 12/18/24 1602    Platelets   227 10*3/mm3 140 - 450 12/18/24 1602       253 10*3/mm3 140 - 450 08/28/24 1625       255 10*3/mm3 140 - 450 07/31/24 2116    1 hour GTT   95 mg/dL 65 - 139 12/18/24 1602    Antibody Screen (repeated)  Negative   01/14/25 1547    3rd TM syphilis scrn (repeated)  RPR   Non Reactive  Non Reactive 12/18/24 1602    3rd TM syphilis scrn (repeated) TP-Ab        3rd TM syphilis screen TB-Ab (FTA)        Syphilis cascade test TP-Ab (EIA)        Syphilis cascade TPPA        GTT Fasting        GTT 1 Hr        GTT 2 Hr        GTT 3 Hr        Group B Strep                  Other testing        Test Value Reference Range Date Time    Parvo IgG         CMV IgG                   Drug Screening       Test Value Reference Range Date Time    Amphetamine Screen        Barbiturate Screen  Negative  Negative 08/28/24 1620    Benzodiazepine Screen  Negative  Negative 08/28/24 1620    Methadone Screen  Negative  Negative 08/28/24 1620    Phencyclidine Screen        Opiates Screen  Negative  Negative 08/28/24 1620    THC Screen  Negative  Negative 08/28/24 1620    Cocaine Screen  Negative  Negative 08/28/24 1620    Propoxyphene Screen        Buprenorphine Screen        Methamphetamine Screen        Oxycodone Screen  Negative  Negative 08/28/24 1620    Tricyclic  Antidepressants Screen                  Legend    ^: Historical                          External Prenatal Results       Pregnancy Outside Results - Transcribed From Office Records - See Scanned Records For Details       Test Value Date Time    ABO  A  01/14/25 1547    Rh  Negative  01/14/25 1547    Antibody Screen  Negative  01/14/25 1547       Negative  08/28/24 1625    Varicella IgG       Rubella  1.66 index 08/28/24 1625    Hgb  11.5 g/dL 12/18/24 1602       13.1 g/dL 08/28/24 1625       12.9 g/dL 07/31/24 2116    Hct  34.5 % 12/18/24 1602       40.2 % 08/28/24 1625       38.7 % 07/31/24 2116    HgB A1c   5.20 % 08/28/24 1625    1h GTT  95 mg/dL 12/18/24 1602    3h GTT Fasting       3h GTT 1 hour       3h GTT 2 hour       3h GTT 3 hour        Gonorrhea (discrete)  Not Detected  08/28/24 1620    Chlamydia (discrete)  Not Detected  08/28/24 1620    RPR  Non Reactive  12/18/24 1602       Non-Reactive  08/28/24 1625    Syphils cascade: TP-Ab (FTA)       TP-Ab       TP-Ab (EIA)       TPPA       HBsAg  Non-Reactive  08/28/24 1625    Herpes Simplex Virus PCR       Herpes Simplex VIrus Culture       HIV  Non-Reactive  08/28/24 1625    Hep C RNA Quant PCR       Hep C Antibody  Non-Reactive  08/28/24 1625    AFP       NIPT       Cystic Fibrosis (Fab)       Cystic Fibroisis        Spinal Muscular atrophy       Fragile X       Group B Strep       GBS Susceptibility to Clindamycin       GBS Susceptibility to Erythromycin       Fetal Fibronectin       Genetic Testing, Maternal Blood                 Drug Screening       Test Value Date Time    Urine Drug Screen       Amphetamine Screen       Barbiturate Screen  Negative  08/28/24 1620    Benzodiazepine Screen  Negative  08/28/24 1620    Methadone Screen  Negative  08/28/24 1620    Phencyclidine Screen       Opiates Screen  Negative  08/28/24 1620    THC Screen  Negative  08/28/24 1620    Cocaine Screen       Propoxyphene Screen       Buprenorphine Screen        Methamphetamine Screen       Oxycodone Screen  Negative  24 1620    Tricyclic Antidepressants Screen                 Legend    ^: Historical                             Past OB History:     OB History    Para Term  AB Living   1 0 0 0 0 0   SAB IAB Ectopic Molar Multiple Live Births   0 0 0 0 0 0      # Outcome Date GA Lbr Ben/2nd Weight Sex Type Anes PTL Lv   1 Current                Past Medical History: Past Medical History:   Diagnosis Date    Anemia     Anxiety     Asthma 2018    Chlamydia     Depression     Heart murmur     Migraine     Syncope     Urinary tract infection       Past Surgical History Past Surgical History:   Procedure Laterality Date    WISDOM TOOTH EXTRACTION Bilateral       Family History: Family History   Problem Relation Age of Onset    Anxiety disorder Mother     Depression Mother     Miscarriages / Stillbirths Mother     Anxiety disorder Sister     Breast cancer Maternal Aunt     Cirrhosis Maternal Grandmother     Hypertension Maternal Grandmother     Diabetes Maternal Grandmother     Liver disease Maternal Grandmother     Vision loss Maternal Grandmother     Heart disease Maternal Grandfather     Diabetes Paternal Grandmother     Arrhythmia Paternal Grandmother         has a pacemaker    Hypertension Paternal Grandfather     Pulmonary embolism Paternal Grandfather     Coronary artery disease Paternal Grandfather     Asthma Brother     Ovarian cancer Neg Hx     Uterine cancer Neg Hx     Melanoma Neg Hx     Deep vein thrombosis Neg Hx       Social History:  reports that she has never smoked. She has never been exposed to tobacco smoke. She has never used smokeless tobacco.   reports no history of alcohol use.   reports no history of drug use.        General ROS: Pertinent items are noted in HPI    Home Medications:  Prenatal MV-Min-Fe Fum-FA-DHA, albuterol sulfate HFA, escitalopram, labetalol, loratadine, and ondansetron    Allergies:  No Known Allergies    Objective  "      Vital Signs Range for the last 24 hours  Temperature:     Temp Source:     BP: BP: (132)/(83) 132/83   Pulse: Heart Rate:  [117] 117   Respirations: Resp:  [18] 18   SPO2: SpO2:  [98 %] 98 %     Physical Examination:   General appearance - alert, well appearing, and in no distress  Mental status - alert, oriented to person, place, and time  Chest - clear to auscultation  Heart - normal rate, regular rhythm,  Abdomen - soft, nontender, nondistended,   Pelvic - normal external genitalia, vulva, vagina, cervix, and uterus.  No blood noted in vaginal area.  Back exam - full range of motion, no tenderness or pain on motion  Neurological - alert, oriented, normal speech  Extremities - peripheral pulses normal  Skin - normal coloration and turgor, no suspicious skin lesions noted    Presentation: Vertex   Cervix: Method: sterile speculum exam performed (Dr. Stein)   Dilation:     Effacement:     Station:         Fetal Heart Rate Assessment   Method: Fetal HR Assessment Method: external   Beats/min: Fetal HR (beats/min): 140   Baseline: Fetal HR Baseline: normal range   Variability: Fetal HR Variability: moderate (amplitude range 6 to 25 bpm)   Accels: Fetal HR Accelerations: greater than/equal to 15 bpm, lasting at least 15 seconds   Decels: Fetal HR Decelerations: absent   Tracing Category:       Uterine Assessment   Method: Method: external tocotransducer, palpation   Frequency (min):     Ctx Count in 10 min:     Duration:     Intensity: Contraction Intensity: no contractions   [place] Units:       GBS is unknown       Assessment & Plan       Spotting affecting pregnancy in third trimester        Assessment:  1.  Intrauterine pregnancy at 32w5d gestation with reactive, reassuring fetal status.    2.  vaginal spotting resolved prior to be seen in triage.  No vaginal bleeding or pain noted on  exam.  3.  Obstetrical history significant for   .  4.  GBS status: No results found for: \"STREPGPB\"    Plan:  1. " Vaginal spotting resolve.  2.  Discharge to home  2.  Plan of care has been reviewed with patient and patient agrees.   3.  Risks, benefits of treatment plan have been discussed.  4.  All questions have been answered.  5.  Keep next scheduled routine OB appt with Dr Yang on 2/11/25.    Magali Stein MD        Electronically signed by Magali Stein MD, 02/07/25, 2:57 PM EST.

## 2025-02-07 NOTE — TELEPHONE ENCOUNTER
Patient called in stated that she just started lightly bleeding and it was bright red, has not had intercourse lately. With her being 32w 5d told her it is best to go over to L&D to get checked out. She was heading there now.

## 2025-02-07 NOTE — NON STRESS TEST
Obstetrical Non-stress Test Interpretation     Name:  Washington Leonard  MRN: [id number]    19 y.o. female  at 32w5d    Indication: spotting triage      Fetal Assessment  Fetal Movement: active  Fetal HR Assessment Method: external  Fetal HR (beats/min): 140  Fetal HR Baseline: normal range  Fetal HR Variability: moderate (amplitude range 6 to 25 bpm)  Fetal HR Accelerations: greater than/equal to 15 bpm, lasting at least 15 seconds  Fetal HR Decelerations: absent  Sinusoidal Pattern Present: absent    /83 (BP Location: Right arm, Patient Position: Lying)   Pulse 117   Resp 18   LMP 2024 (Exact Date)   SpO2 98%     Reason for test: OB Triage (spotting)  Date of Test: 2025  Time frame of test: 0586-5788  RN NST Interpretation: Reactive      Chris Segura RN  2025  14:54 EST

## 2025-02-11 ENCOUNTER — TELEPHONE (OUTPATIENT)
Dept: OBSTETRICS AND GYNECOLOGY | Facility: CLINIC | Age: 20
End: 2025-02-11

## 2025-02-11 NOTE — TELEPHONE ENCOUNTER
Caller: TachoMarita Leonard  Female, 19 y.o., 2005  MRN: [id number]  CSN: [id number]  Phone: [phone]    Relationship to patient: SELF        Type of visit: OB FOLLOW UP    Requested date: ANY DAY NEXT WEEK     If rescheduling, when is the original appointment: 2-11-25     Additional notes:PT REQ A CALL BACK TO [facility], [facility] NOT UNTIL 3-19-25

## 2025-02-12 ENCOUNTER — TELEPHONE (OUTPATIENT)
Dept: OBSTETRICS AND GYNECOLOGY | Facility: CLINIC | Age: 20
End: 2025-02-12
Payer: COMMERCIAL

## 2025-02-12 NOTE — TELEPHONE ENCOUNTER
[id number]:   This is [facility] office. We need to reschedule your appt on [id number] with dr echavarria, she is not starting the office day until 1030 am. Please call the office to reschedule this appt

## 2025-02-19 ENCOUNTER — ROUTINE PRENATAL (OUTPATIENT)
Dept: OBSTETRICS AND GYNECOLOGY | Facility: CLINIC | Age: 20
End: 2025-02-19
Payer: COMMERCIAL

## 2025-02-19 ENCOUNTER — PATIENT MESSAGE (OUTPATIENT)
Dept: OBSTETRICS AND GYNECOLOGY | Facility: CLINIC | Age: 20
End: 2025-02-19

## 2025-02-19 VITALS — BODY MASS INDEX: 26.76 KG/M2 | DIASTOLIC BLOOD PRESSURE: 88 MMHG | WEIGHT: 176 LBS | SYSTOLIC BLOOD PRESSURE: 127 MMHG

## 2025-02-19 DIAGNOSIS — Z67.91 RH NEGATIVE, ANTEPARTUM: ICD-10-CM

## 2025-02-19 DIAGNOSIS — F32.A ANXIETY AND DEPRESSION: ICD-10-CM

## 2025-02-19 DIAGNOSIS — O26.899 RH NEGATIVE, ANTEPARTUM: ICD-10-CM

## 2025-02-19 DIAGNOSIS — Z34.00 SUPERVISION OF NORMAL FIRST PREGNANCY, ANTEPARTUM: Primary | ICD-10-CM

## 2025-02-19 DIAGNOSIS — O26.849 UTERINE SIZE DATE DISCREPANCY PREGNANCY: ICD-10-CM

## 2025-02-19 DIAGNOSIS — F41.9 ANXIETY AND DEPRESSION: ICD-10-CM

## 2025-02-19 PROBLEM — O26.853 SPOTTING AFFECTING PREGNANCY IN THIRD TRIMESTER: Status: RESOLVED | Noted: 2025-02-07 | Resolved: 2025-02-19

## 2025-02-19 PROBLEM — R00.2 PALPITATIONS: Status: RESOLVED | Noted: 2023-08-15 | Resolved: 2025-02-19

## 2025-02-19 PROBLEM — K21.9 GASTROESOPHAGEAL REFLUX DISEASE: Status: RESOLVED | Noted: 2024-07-10 | Resolved: 2025-02-19

## 2025-02-19 LAB
GLUCOSE UR STRIP-MCNC: NEGATIVE MG/DL
PROT UR STRIP-MCNC: NEGATIVE MG/DL

## 2025-02-19 NOTE — PROGRESS NOTES
Routine Prenatal Visit     Subjective  Washington Leonard is a 19 y.o.  at 34w3d here for her routine OB visit.   She is taking her prenatal vitamins.Reports no loss of fluid or vaginal bleeding. Patient doing well without any complaints. Pregnancy complicated by:     Patient Active Problem List   Diagnosis    Syncope and collapse    Anxiety and depression    Supervision of other normal pregnancy, antepartum    Rh negative status during pregnancy         OB History    Para Term  AB Living   1 0 0 0 0 0   SAB IAB Ectopic Molar Multiple Live Births   0 0 0 0 0 0      # Outcome Date GA Lbr Ben/2nd Weight Sex Type Anes PTL Lv   1 Current                    ROS:   Review of Systems - General ROS: negative  Psychological ROS: negative  Endocrine ROS: negative  Breast ROS: negative  Respiratory ROS: negative  Cardiovascular ROS: negative  Gastrointestinal ROS: negative  Genito-Urinary ROS: negative  Musculoskeletal ROS: negative  Neurological ROS: negative  Dermatological ROS: negative    Objective  Physical Exam:   Vitals:    25 1249   BP: 127/88       Uterine Size: size greater than dates  FHT: 110-160 BPM    General appearance - alert, well appearing, and in no distress  Mental status - alert, oriented to person, place, and time  Abdomen- Soft, Gravid uterus, non-tender to palpation  Musculoskeletal: negative for - gait disturbance or joint pain  Extremeties: negative swelling or cyanosis   Dermatological: negative rashes or skin lesions       Assessment/Plan:   Diagnoses and all orders for this visit:    1. Supervision of normal first pregnancy, antepartum (Primary)  -     POC Urinalysis Dipstick    2. Anxiety and depression  Assessment & Plan:  Stable on Lexapro 10mg daily       3. Uterine size date discrepancy pregnancy  -     US Ob 14 + Weeks Single or First Gestation; Future    4. Rh negative, antepartum  Assessment & Plan:  Received Rhogam 25              Counseling:   OB  precautions, leaking, VB, josé yu vs PTL/Labor  FKC  HTN precautions reviewed: HA, vision change, RUQ/epigastric pain, edema  Round Ligament Pain:  The uterus has several ligaments which provide support and keep the uterus in place. As the  uterus grows these ligaments are pulled and stretched which often causes sharp stabbing like pain in the inguinal area.   You may find a pregnancy support band helpful. Changing positions may also help. Yoga is a great way to cope with round ligament and low back pain in pregnancy.    Massage may also help with low back pain   Things to Consider at this Point in your Pregnancy:  Some women experience swelling in their feet during pregnancy. Compression stockings may help  Drink plenty of water and stay active   Make sure you are eating frequent small meals, nuts are a wonderful snack to keep with you            Return in about 2 weeks (around 3/5/2025) for Routine OB visit.      We have gone over prenatal care to include the timing and content of visits. I informed her how to contact the office and/or on call person in the event of any problems and encouraged her to do so when she feels it is necessary.  We then spent time answering her questions which she indicated were answered to her satisfaction.    Sharon King,   2/19/2025 13:05 EST     III with phonation

## 2025-03-05 ENCOUNTER — TELEPHONE (OUTPATIENT)
Dept: FAMILY MEDICINE CLINIC | Facility: CLINIC | Age: 20
End: 2025-03-05
Payer: COMMERCIAL

## 2025-03-05 NOTE — TELEPHONE ENCOUNTER
Caller: Washington Leonard    Relationship to patient: Self    Best call back number: [phone]     Patient is needing: PATIENT STATES SHE HAS TO HAVE A T DAP VACCINE AND WOULD LIKE TO KNOW IF SHE CAN JUST COME IN AND GET THE VACCINE WITHOUT AN APPOINTMENT    PLEASE CALL TO CONFIRM

## 2025-03-06 ENCOUNTER — ROUTINE PRENATAL (OUTPATIENT)
Dept: OBSTETRICS AND GYNECOLOGY | Facility: CLINIC | Age: 20
End: 2025-03-06
Payer: COMMERCIAL

## 2025-03-06 VITALS — SYSTOLIC BLOOD PRESSURE: 128 MMHG | BODY MASS INDEX: 27.22 KG/M2 | DIASTOLIC BLOOD PRESSURE: 74 MMHG | WEIGHT: 179 LBS

## 2025-03-06 DIAGNOSIS — F32.A ANXIETY AND DEPRESSION: ICD-10-CM

## 2025-03-06 DIAGNOSIS — O36.60X0 EXCESSIVE FETAL GROWTH AFFECTING MANAGEMENT OF PREGNANCY, ANTEPARTUM, SINGLE OR UNSPECIFIED FETUS: ICD-10-CM

## 2025-03-06 DIAGNOSIS — O26.899 RH NEGATIVE, ANTEPARTUM: ICD-10-CM

## 2025-03-06 DIAGNOSIS — F41.9 ANXIETY AND DEPRESSION: ICD-10-CM

## 2025-03-06 DIAGNOSIS — Z34.00 SUPERVISION OF NORMAL FIRST PREGNANCY, ANTEPARTUM: Primary | ICD-10-CM

## 2025-03-06 DIAGNOSIS — Z67.91 RH NEGATIVE, ANTEPARTUM: ICD-10-CM

## 2025-03-06 DIAGNOSIS — O26.849 UTERINE SIZE DATE DISCREPANCY PREGNANCY: ICD-10-CM

## 2025-03-06 LAB
DEPRECATED RDW RBC AUTO: 44.5 FL (ref 37–54)
ERYTHROCYTE [DISTWIDTH] IN BLOOD BY AUTOMATED COUNT: 13.2 % (ref 12.3–15.4)
GLUCOSE UR STRIP-MCNC: NEGATIVE MG/DL
HCT VFR BLD AUTO: 41 % (ref 34–46.6)
HGB BLD-MCNC: 13.3 G/DL (ref 12–15.9)
MCH RBC QN AUTO: 29.6 PG (ref 26.6–33)
MCHC RBC AUTO-ENTMCNC: 32.4 G/DL (ref 31.5–35.7)
MCV RBC AUTO: 91.3 FL (ref 79–97)
PLATELET # BLD AUTO: 189 10*3/MM3 (ref 140–450)
PMV BLD AUTO: 10.4 FL (ref 6–12)
PROT UR STRIP-MCNC: NEGATIVE MG/DL
RBC # BLD AUTO: 4.49 10*6/MM3 (ref 3.77–5.28)
WBC NRBC COR # BLD AUTO: 11.83 10*3/MM3 (ref 3.4–10.8)

## 2025-03-06 PROCEDURE — 87653 STREP B DNA AMP PROBE: CPT | Performed by: OBSTETRICS & GYNECOLOGY

## 2025-03-06 PROCEDURE — 85027 COMPLETE CBC AUTOMATED: CPT | Performed by: OBSTETRICS & GYNECOLOGY

## 2025-03-06 NOTE — PROGRESS NOTES
Routine Prenatal Visit     Subjective  Washington Leonard is a 19 y.o.  at 36w4d here for her routine OB visit.   She is taking her prenatal vitamins.Reports no loss of fluid or vaginal bleeding. Patient doing well without any complaints. Pregnancy complicated by:     Patient Active Problem List   Diagnosis    Syncope and collapse    Anxiety and depression    Supervision of other normal pregnancy, antepartum    Rh negative status during pregnancy    LGA (large for gestational age) fetus affecting management of mother         OB History    Para Term  AB Living   1 0 0 0 0 0   SAB IAB Ectopic Molar Multiple Live Births   0 0 0 0 0 0      # Outcome Date GA Lbr Ben/2nd Weight Sex Type Anes PTL Lv   1 Current                    ROS:   Review of Systems - General ROS: negative  Psychological ROS: negative  Endocrine ROS: negative  Breast ROS: negative  Respiratory ROS: negative  Cardiovascular ROS: negative  Gastrointestinal ROS: negative  Genito-Urinary ROS: negative  Musculoskeletal ROS: negative  Neurological ROS: negative  Dermatological ROS: negative    Objective  Physical Exam:   Vitals:    25 1048   BP: 128/74       Uterine Size: not examined, US today  FHT: 110-160 BPM    General appearance - alert, well appearing, and in no distress  Mental status - alert, oriented to person, place, and time  Abdomen- Soft, Gravid uterus, non-tender to palpation  Musculoskeletal: negative for - gait disturbance or joint pain  Extremeties: negative swelling or cyanosis   Dermatological: negative rashes or skin lesions   GBS RV swab performed today    Assessment/Plan:   Diagnoses and all orders for this visit:    1. Supervision of normal first pregnancy, antepartum (Primary)  -     POC Urinalysis Dipstick  -     CBC (No Diff)  -     Group B Strep Molecular by PCR - Swab, Vaginal/Rectum    2. Anxiety and depression  Assessment & Plan:  Stable on Lexapro 10mg daily       3. Uterine size date  discrepancy pregnancy    4. Rh negative, antepartum  Assessment & Plan:  Received Rhogam 1/14/25      5. Excessive fetal growth affecting management of pregnancy, antepartum, single or unspecified fetus  Assessment & Plan:  Growth US 3/6/25 EFW 7lb8oz, Growth 87%  Discussed IOL around 39 weeks               Counseling:   OB precautions, leaking, VB, josé yu vs PTL/Labor  FKC  HTN precautions reviewed: HA, vision change, RUQ/epigastric pain, edema  Round Ligament Pain:  The uterus has several ligaments which provide support and keep the uterus in place. As the  uterus grows these ligaments are pulled and stretched which often causes sharp stabbing like pain in the inguinal area.   You may find a pregnancy support band helpful. Changing positions may also help. Yoga is a great way to cope with round ligament and low back pain in pregnancy.    Massage may also help with low back pain   Things to Consider at this Point in your Pregnancy:  Some women experience swelling in their feet during pregnancy. Compression stockings may help  Drink plenty of water and stay active   Make sure you are eating frequent small meals, nuts are a wonderful snack to keep with you            Return in about 1 week (around 3/13/2025) for Routine OB visit.      We have gone over prenatal care to include the timing and content of visits. I informed her how to contact the office and/or on call person in the event of any problems and encouraged her to do so when she feels it is necessary.  We then spent time answering her questions which she indicated were answered to her satisfaction.    Sharon King DO  3/6/2025 11:32 EST

## 2025-03-06 NOTE — TELEPHONE ENCOUNTER
Caller: Washington Leonard    Relationship to patient: Self    Best call back number: [phone]    Patient is needing: PATIENT CALLING IN FOR UPDATE ABOUT THIS, INFORMED PATIENT OFFICE IS CLOSED TODAY 03.06.2025 UNEXPECTEDLY BUT A NURSE WILL REACH OUT AS SOON AS THEY'RE BACK IN OFFICE TO ADVISE.

## 2025-03-07 LAB — GP B STREP DNA VAG+RECTUM QL NAA+PROBE: NEGATIVE

## 2025-03-11 ENCOUNTER — TELEPHONE (OUTPATIENT)
Dept: OBSTETRICS AND GYNECOLOGY | Facility: CLINIC | Age: 20
End: 2025-03-11
Payer: COMMERCIAL

## 2025-03-11 ENCOUNTER — HOSPITAL ENCOUNTER (OUTPATIENT)
Facility: HOSPITAL | Age: 20
Discharge: HOME | End: 2025-03-11
Attending: OBSTETRICS & GYNECOLOGY | Admitting: OBSTETRICS & GYNECOLOGY
Payer: COMMERCIAL

## 2025-03-11 VITALS — SYSTOLIC BLOOD PRESSURE: 136 MMHG | HEART RATE: 103 BPM | DIASTOLIC BLOOD PRESSURE: 84 MMHG | OXYGEN SATURATION: 99 %

## 2025-03-11 PROCEDURE — G0463 HOSPITAL OUTPT CLINIC VISIT: HCPCS

## 2025-03-11 PROCEDURE — 59025 FETAL NON-STRESS TEST: CPT

## 2025-03-11 NOTE — OBED NOTES
ANAI Mckinney  Obstetric History and Physical    Chief Complaint   Patient presents with    Abdominal Pain    Decreased Fetal Movement       Subjective     Patient is a 19 y.o. female  currently at 37w2d, who presents with complaint of abdominal pain and decreased fetal movements.  She denies any loss of fluid or vaginal bleeding.    [place]  [facility] [place] is benign.  Her previous obstetric/gynecological history is noted for is non-contributory.    The following portions of the patients history were reviewed and updated as appropriate: current medications, allergies, past medical history, past surgical history, past family history, and past social history .       Prenatal Information:  Prenatal Results       Initial Prenatal Labs       Test Value Reference Range Date Time    Hemoglobin  13.1 g/dL 12.0 - 15.9 24 1625       12.9 g/dL 12.0 - 15.9 24 211    Hematocrit  40.2 % 34.0 - 46.6 24 1625       38.7 % 34.0 - 46.6 24 211    Platelets  253 10*3/mm3 140 - 450 24 1625       255 10*3/mm3 140 - 450 24 2116    Rubella IgG  1.66 index Immune >0.99 24 1625    Hepatitis B SAg  Non-Reactive  Non-Reactive 24 1625    Hepatitis C Ab  Non-Reactive  Non-Reactive 24 1625    RPR  Non Reactive  Non Reactive 24 1602       Non-Reactive  Non-Reactive 24 1625    T. Pallidum Ab         ABO  A   25 1547    Rh  Negative   25 1547    Antibody Screen  Negative   24 1625    HIV  Non-Reactive  Non-Reactive 24 1625    Urine Culture  <25,000 CFU/mL Normal Urogenital Hayley   10/29/24 1621       >100,000 CFU/mL Mixed Hayley Isolated   24 1620    Gonorrhea  Not Detected  Not Detected  24 1620    Chlamydia  Not Detected  Not Detected  24 1620    TSH        HgB A1c   5.20 % 4.80 - 5.60 24 1625    Varicella IgG        Hemoglobinopathy Fractionation  Comment   24 1625    Hemoglobinopathy (genetic testing)         Cystic fibrosis         Spinal muscular atrophy        Fragile X                  Fetal testing        Test Value Reference Range Date Time    NIPT        MSAFP        AFP-4                  2nd and 3rd Trimester       Test Value Reference Range Date Time    Hemoglobin (repeated)  13.3 g/dL 12.0 - 15.9 03/06/25 1108       11.5 g/dL 12.0 - 15.9 12/18/24 1602    Hematocrit (repeated)  41.0 % 34.0 - 46.6 03/06/25 1108       34.5 % 34.0 - 46.6 12/18/24 1602    Platelets   189 10*3/mm3 140 - 450 03/06/25 1108       227 10*3/mm3 140 - 450 12/18/24 1602       253 10*3/mm3 140 - 450 08/28/24 1625       255 10*3/mm3 140 - 450 07/31/24 2116    1 hour GTT   95 mg/dL 65 - 139 12/18/24 1602    Antibody Screen (repeated)  Negative   01/14/25 1547    3rd TM syphilis scrn (repeated)  RPR   Non Reactive  Non Reactive 12/18/24 1602    3rd TM syphilis scrn (repeated) TP-Ab        3rd TM syphilis screen TB-Ab (FTA)        Syphilis cascade test TP-Ab (EIA)        Syphilis cascade TPPA        GTT Fasting        GTT 1 Hr        GTT 2 Hr        GTT 3 Hr        Group B Strep  Negative  Negative 03/06/25 1102              Other testing        Test Value Reference Range Date Time    Parvo IgG         CMV IgG                   Drug Screening       Test Value Reference Range Date Time    Amphetamine Screen        Barbiturate Screen  Negative  Negative 08/28/24 1620    Benzodiazepine Screen  Negative  Negative 08/28/24 1620    Methadone Screen  Negative  Negative 08/28/24 1620    Phencyclidine Screen        Opiates Screen  Negative  Negative 08/28/24 1620    THC Screen  Negative  Negative 08/28/24 1620    Cocaine Screen  Negative  Negative 08/28/24 1620    Propoxyphene Screen        Buprenorphine Screen        Methamphetamine Screen        Oxycodone Screen  Negative  Negative 08/28/24 1620    Tricyclic Antidepressants Screen                  Legend    ^: Historical                          External Prenatal Results       Pregnancy Outside  Results - Transcribed From Office Records - See Scanned Records For Details       Test Value Date Time    ABO  A  01/14/25 1547    Rh  Negative  01/14/25 1547    Antibody Screen  Negative  01/14/25 1547       Negative  08/28/24 1625    Varicella IgG       Rubella  1.66 index 08/28/24 1625    Hgb  13.3 g/dL 03/06/25 1108       11.5 g/dL 12/18/24 1602       13.1 g/dL 08/28/24 1625       12.9 g/dL 07/31/24 2116    Hct  41.0 % 03/06/25 1108       34.5 % 12/18/24 1602       40.2 % 08/28/24 1625       38.7 % 07/31/24 2116    HgB A1c   5.20 % 08/28/24 1625    1h GTT  95 mg/dL 12/18/24 1602    3h GTT Fasting       3h GTT 1 hour       3h GTT 2 hour       3h GTT 3 hour        Gonorrhea (discrete)  Not Detected  08/28/24 1620    Chlamydia (discrete)  Not Detected  08/28/24 1620    RPR  Non Reactive  12/18/24 1602       Non-Reactive  08/28/24 1625    Syphils cascade: TP-Ab (FTA)       TP-Ab       TP-Ab (EIA)       TPPA       HBsAg  Non-Reactive  08/28/24 1625    Herpes Simplex Virus PCR       Herpes Simplex VIrus Culture       HIV  Non-Reactive  08/28/24 1625    Hep C RNA Quant PCR       Hep C Antibody  Non-Reactive  08/28/24 1625    AFP       NIPT       Cystic Fibrosis (Fab)       Cystic Fibroisis        Spinal Muscular atrophy       Fragile X       Group B Strep  Negative  03/06/25 1102    GBS Susceptibility to Clindamycin       GBS Susceptibility to Erythromycin       Fetal Fibronectin       Genetic Testing, Maternal Blood                 Drug Screening       Test Value Date Time    Urine Drug Screen       Amphetamine Screen       Barbiturate Screen  Negative  08/28/24 1620    Benzodiazepine Screen  Negative  08/28/24 1620    Methadone Screen  Negative  08/28/24 1620    Phencyclidine Screen       Opiates Screen  Negative  08/28/24 1620    THC Screen  Negative  08/28/24 1620    Cocaine Screen       Propoxyphene Screen       Buprenorphine Screen       Methamphetamine Screen       Oxycodone Screen  Negative  08/28/24 1620     Tricyclic Antidepressants Screen                 Legend    ^: Historical                             Past OB History:     OB History    Para Term  AB Living   1 0 0 0 0 0   SAB IAB Ectopic Molar Multiple Live Births   0 0 0 0 0 0      # Outcome Date GA Lbr Ben/2nd Weight Sex Type Anes PTL Lv   1 Current                Past Medical History: Past Medical History:   Diagnosis Date    Anemia     Anxiety     Asthma 2018    Chlamydia     Depression     Heart murmur     Migraine     Syncope     Urinary tract infection       Past Surgical History Past Surgical History:   Procedure Laterality Date    WISDOM TOOTH EXTRACTION Bilateral       Family History: Family History   Problem Relation Age of Onset    Anxiety disorder Mother     Depression Mother     Miscarriages / Stillbirths Mother     Anxiety disorder Sister     Breast cancer Maternal Aunt     Cirrhosis Maternal Grandmother     Hypertension Maternal Grandmother     Diabetes Maternal Grandmother     Liver disease Maternal Grandmother     Vision loss Maternal Grandmother     Heart disease Maternal Grandfather     Diabetes Paternal Grandmother     Arrhythmia Paternal Grandmother         has a pacemaker    Hypertension Paternal Grandfather     Pulmonary embolism Paternal Grandfather     Coronary artery disease Paternal Grandfather     Asthma Brother     Ovarian cancer Neg Hx     Uterine cancer Neg Hx     Melanoma Neg Hx     Deep vein thrombosis Neg Hx       Social History:  reports that she has never smoked. She has never been exposed to tobacco smoke. She has never used smokeless tobacco.   reports no history of alcohol use.   reports no history of drug use.        General ROS: Pertinent items are noted in HPI    Objective       Vital Signs Range for the last 24 hours  Temperature:     Temp Source:     BP: BP: (136)/(84) 136/84   Pulse: Heart Rate:  [103] 103   Respirations:     SPO2: SpO2:  [99 %] 99 %   O2 Amount (l/min):     O2 Devices     Weight:        Physical Examination: General appearance - alert, well appearing, and in no distress  Mental status - alert, oriented to person, place, and time  Chest - clear to auscultation, no wheezes, rales or rhonchi, symmetric air entry  Heart - normal rate, regular rhythm, normal S1, S2, no murmurs, rubs, clicks or gallops  Abdomen - soft, nontender, gravid  Extremities - peripheral pulses normal, no pedal edema, no clubbing or cyanosis  Skin - normal coloration and turgor, no rashes, no suspicious skin lesions noted    Presentation: Vertex   Cervix: Exam by: Method: sterile vaginal exam performed   Dilation: Cervical Dilation (cm): 3   Effacement: Cervical Effacement: 70   Station:         Fetal Heart Rate Assessment   Method:     Beats/min:     Baseline:     Variability:     Accels:     Decels:           Uterine Assessment   Method:     Frequency (min):     Ctx Count in 10 min:     Duration:     Intensity:         [place] Units:       GBS is negative      Assessment & Plan     Contractions  Decreased fetal movement      Assessment:  1.  Intrauterine pregnancy at 37w2d gestation with reactive fetal status.    2.  False labor.  Reassuring fetal testing.        Plan:  Discharge home  Strict fetal kick count  Labor precautions      Amado Gordon MD  3/11/2025  17:25 EDT

## 2025-03-11 NOTE — TELEPHONE ENCOUNTER
Patient called in stating that she has been having a lot of pressure in her lower pelvic area, as well as not feeling the baby as much the past two days, advised her to go to L&D right away to get checked out.

## 2025-03-11 NOTE — NON STRESS TEST
Obstetrical Non-stress Test Interpretation     Name:  Washington Leonard  MRN: [id number]    19 y.o. female  at 37w2d    Indication: decreased fetal mvmt and pelvic pressure      Fetal Assessment  Fetal HR Assessment Method: external  Fetal HR (beats/min): 155  Fetal HR Baseline: normal range  Fetal HR Variability: moderate (amplitude range 6 to 25 bpm)  Fetal HR Accelerations: greater than/equal to 15 bpm, lasting at least 15 seconds  Fetal HR Decelerations: absent    /84   Pulse 103   LMP 2024 (Exact Date)   SpO2 99%     Reason for test: OB Triage  Date of Test: 3/11/2025  Time frame of test: 4358-7972  RN NST Interpretation: Reactive      Gwendolyn Robles  3/11/2025  18:10 EDT

## 2025-03-11 NOTE — PROGRESS NOTES
Routine Prenatal Visit     Subjective  Washington Leonard is a 19 y.o.  at 37w3d here for her routine OB visit.   She is taking her prenatal vitamins.Reports no loss of fluid or vaginal bleeding. Patient doing well.  States she was in triage yesterday for contractions.  She was on to be 3 cm.  Since that time she has had some bloody mucus noted.    Pregnancy complicated by:     Patient Active Problem List   Diagnosis    Syncope and collapse    Anxiety and depression    Supervision of other normal pregnancy, antepartum    Rh negative status during pregnancy    LGA (large for gestational age) fetus affecting management of mother         OB History    Para Term  AB Living   1 0 0 0 0 0   SAB IAB Ectopic Molar Multiple Live Births   0 0 0 0 0 0      # Outcome Date GA Lbr Ben/2nd Weight Sex Type Anes PTL Lv   1 Current                    ROS:  General ROS: negative for - chills or fatigue  Genito-Urinary ROS: negative for  change in urinary stream, vaginal discharge     Objective  Physical Exam:   Vitals:    25 1050   BP: 126/87       Uterine Size: size equals dates  FHT: 110-160 BPM    SVE declines    Assessment/Plan:   Diagnoses and all orders for this visit:    1. Supervision of other normal pregnancy, antepartum (Primary)  -     POC Urinalysis Dipstick  -     sodium chloride 0.9 % flush 10 mL  -     sodium chloride 0.9 % flush 10 mL  -     sodium chloride 0.9 % infusion 40 mL  -     lidocaine PF 1% (XYLOCAINE) injection 0.5 mL  -     lactated ringers bolus 1,000 mL  -     lactated ringers infusion  -     acetaminophen (TYLENOL) tablet 650 mg  -     morphine injection 5 mg  -     ondansetron ODT (ZOFRAN-ODT) disintegrating tablet 4 mg  -     ondansetron (ZOFRAN) injection 4 mg  -     promethazine (PHENERGAN) tablet 12.5 mg  -     metoclopramide (REGLAN) 10 mg in sodium chloride 0.9 % 50 mL IVPB  -     oxytocin (PITOCIN) 30 units in 0.9% sodium chloride 500 mL (premix)  -     terbutaline  (BRETHINE) injection 0.25 mg  -     famotidine (PEPCID) injection 20 mg  -     famotidine (PEPCID) tablet 20 mg  -     Sod Citrate-Citric Acid (BICITRA) oral solution 30 mL  -     oxytocin (PITOCIN) 30 units in 0.9% sodium chloride 500 mL (premix)  -     oxytocin (PITOCIN) 30 units in 0.9% sodium chloride 500 mL (premix)  -     acetaminophen (TYLENOL) tablet 650 mg  -     ibuprofen (ADVIL,MOTRIN) tablet 800 mg  -     HYDROcodone-acetaminophen (NORCO) 5-325 MG per tablet 1 tablet; Refill: 0  -     HYDROcodone-acetaminophen (NORCO)  MG per tablet 1 tablet; Refill: 0  -     methylergonovine (METHERGINE) injection 200 mcg  -     miSOPROStol (CYTOTEC) tablet 800 mcg  -     ondansetron ODT (ZOFRAN-ODT) disintegrating tablet 4 mg  -     ondansetron (ZOFRAN) injection 4 mg  -     promethazine (PHENERGAN) tablet 25 mg  -     famotidine (PEPCID) injection 20 mg  -     famotidine (PEPCID) tablet 20 mg    2. Excessive fetal growth affecting management of pregnancy, antepartum, single or unspecified fetus    3. Rh negative, antepartum    Other orders  -     Admit To Obstetrics Inpatient; Standing  -     Code Status and Medical Interventions: CPR (Attempt to Resuscitate); Full; Standing  -     Obtain Informed Consent; Standing  -     Place Sequential Compression Device; Standing  -     Maintain Sequential Compression Device; Standing  -     Vital Signs q 4 while awake; Standing  -     Vital Signs Per Hospital Policy; Standing  -     Confirm Presence of Amniotic Fluid if Patient Presents With SROM; Standing  -     Keep Exams to a Minimum in Patient With SROM; Standing  -     Mini-Prep Prior to Delivery; Standing  -     Continuous Fetal Monitoring With NST on Admission and Prior to Initiation of Oxytocin.; Standing  -     External Uterine Contraction Monitoring; Standing  -     Notify Provider (Specified); Standing  -     Notify Provider of Tachysystole (Per Hospital Algorithm); Standing  -     Notify Provider if Membranes  Ruptured, Bleeding Greater Than 1 Pad Per Hour, Fetal Heart Tone Abnormality or Severe Pain; Standing  -     May Ambulate if Membranes Intact or Head Engaged With Ruptured BOW or Normal Tracing for 20 Minutes; Standing  -     Patient May Shower; Standing  -     POC Glucose PRN; Standing  -     Intake and Output (If on Tocolytics); Standing  -     Cervical Exam Every 1-2 Hours When in Active Labor or At RN Discretion, Unless Contraindicated; Standing  -     Initiate Group Beta Strep (GBS) Prophylaxis Protocol, If Criteria Met; Standing  -     Bedside Ultrasound for Fetal Presentation; Standing  -     Position Change - For Intra-Uterine Resusitation for Hypertonus, HyperStimulation or Non-Reassuring Fetal Status; Standing  -     Insert Indwelling Urinary Catheter; Standing  -     Assess Need for Indwelling Urinary Catheter - Follow Removal Protocol; Standing  -     Urinary Catheter Care; Standing  -     Camilo Bulb Cervical Ripening With Infusion; Standing  -     Diet: Liquid; Clear Liquid; Fluid Consistency: Thin (IDDSI 0); Standing  -     Inpatient Anesthesiology Consult; Standing  -     CBC (No Diff); Standing  -     Treponema pallidum AB w/Reflex RPR; Standing  -     Urine Drug Screen - Urine, Clean Catch; Standing  -     Type & Screen; Standing  -     Insert Peripheral IV; Standing  -     Saline Lock & Maintain IV Access; Standing  -     Notify Provider (Specified); Standing  -     Vital Signs Per Hospital Policy; Standing  -     Up as Tolerated; Standing  -     Fundal & Lochia Check; Standing  -     Apply Ice to Perineum; Standing  -     Bladder Assessment; Standing  -     Diet: Regular/House; Fluid Consistency: Thin (IDDSI 0); Standing  -     Blood Gas, Arterial, Cord; Standing  -     Blood Gas, Venous, Cord; Standing  -     If indicated -- Please administer RH Immunoglobulin based on results of cord blood evaluation and fetal screen lab tests, pharmacy to dispense; Standing            Counseling:   OB  precautions, leaking, VB, josé yu vs PTL/Labor  FKC  IOL scheduled  Round Ligament Pain:  The uterus has several ligaments which provide support and keep the uterus in place. As the  uterus grows these ligaments are pulled and stretched which often causes sharp stabbing like pain in the inguinal area.   You may find a pregnancy support band helpful. Changing positions may also help. Yoga is a great way to cope with round ligament and low back pain in pregnancy.    Massage may also help with low back pain   Things to Consider at this Point in your Pregnancy:  Some women experience swelling in their feet during pregnancy. Compression stockings may help  Drink plenty of water and stay active   Make sure you are eating frequent small meals, nuts are a wonderful snack to keep with you            Return in about 1 week (around 3/19/2025) for Routine OB visit.      We have gone over prenatal care to include the timing and content of visits. I informed her how to contact the office and/or on call person in the event of any problems and encouraged her to do so when she feels it is necessary.  We then spent time answering her questions which she indicated were answered to her satisfaction.    Gissel Yang DO  3/12/2025 17:55 EDT

## 2025-03-12 ENCOUNTER — ROUTINE PRENATAL (OUTPATIENT)
Dept: OBSTETRICS AND GYNECOLOGY | Facility: CLINIC | Age: 20
End: 2025-03-12
Payer: COMMERCIAL

## 2025-03-12 ENCOUNTER — TELEPHONE (OUTPATIENT)
Dept: OBSTETRICS AND GYNECOLOGY | Facility: CLINIC | Age: 20
End: 2025-03-12

## 2025-03-12 VITALS — SYSTOLIC BLOOD PRESSURE: 126 MMHG | WEIGHT: 180 LBS | DIASTOLIC BLOOD PRESSURE: 87 MMHG | BODY MASS INDEX: 27.37 KG/M2

## 2025-03-12 DIAGNOSIS — Z34.80 SUPERVISION OF OTHER NORMAL PREGNANCY, ANTEPARTUM: Primary | ICD-10-CM

## 2025-03-12 DIAGNOSIS — Z67.91 RH NEGATIVE, ANTEPARTUM: ICD-10-CM

## 2025-03-12 DIAGNOSIS — O36.60X0 EXCESSIVE FETAL GROWTH AFFECTING MANAGEMENT OF PREGNANCY, ANTEPARTUM, SINGLE OR UNSPECIFIED FETUS: ICD-10-CM

## 2025-03-12 DIAGNOSIS — O26.899 RH NEGATIVE, ANTEPARTUM: ICD-10-CM

## 2025-03-12 LAB
GLUCOSE UR STRIP-MCNC: NEGATIVE MG/DL
PROT UR STRIP-MCNC: NEGATIVE MG/DL

## 2025-03-12 RX ORDER — SODIUM CHLORIDE 0.9 % (FLUSH) 0.9 %
10 SYRINGE (ML) INJECTION AS NEEDED
OUTPATIENT
Start: 2025-03-12

## 2025-03-12 RX ORDER — SODIUM CHLORIDE 9 MG/ML
40 INJECTION, SOLUTION INTRAVENOUS AS NEEDED
OUTPATIENT
Start: 2025-03-12

## 2025-03-12 RX ORDER — MISOPROSTOL 100 UG/1
800 TABLET ORAL AS NEEDED
OUTPATIENT
Start: 2025-03-12

## 2025-03-12 RX ORDER — ONDANSETRON 2 MG/ML
4 INJECTION INTRAMUSCULAR; INTRAVENOUS EVERY 6 HOURS PRN
OUTPATIENT
Start: 2025-03-12

## 2025-03-12 RX ORDER — ACETAMINOPHEN 325 MG/1
650 TABLET ORAL EVERY 4 HOURS PRN
OUTPATIENT
Start: 2025-03-12

## 2025-03-12 RX ORDER — CITRIC ACID/SODIUM CITRATE 334-500MG
30 SOLUTION, ORAL ORAL ONCE AS NEEDED
OUTPATIENT
Start: 2025-03-12

## 2025-03-12 RX ORDER — FAMOTIDINE 10 MG
20 TABLET ORAL ONCE AS NEEDED
OUTPATIENT
Start: 2025-03-12

## 2025-03-12 RX ORDER — HYDROCODONE BITARTRATE AND ACETAMINOPHEN 5; 325 MG/1; MG/1
1 TABLET ORAL EVERY 4 HOURS PRN
Refills: 0 | OUTPATIENT
Start: 2025-03-12 | End: 2025-03-19

## 2025-03-12 RX ORDER — TERBUTALINE SULFATE 1 MG/ML
0.25 INJECTION SUBCUTANEOUS AS NEEDED
OUTPATIENT
Start: 2025-03-12

## 2025-03-12 RX ORDER — SODIUM CHLORIDE, SODIUM LACTATE, POTASSIUM CHLORIDE, CALCIUM CHLORIDE 600; 310; 30; 20 MG/100ML; MG/100ML; MG/100ML; MG/100ML
125 INJECTION, SOLUTION INTRAVENOUS CONTINUOUS
OUTPATIENT
Start: 2025-03-12 | End: 2025-03-14

## 2025-03-12 RX ORDER — IBUPROFEN 200 MG
800 TABLET ORAL ONCE AS NEEDED
OUTPATIENT
Start: 2025-03-12

## 2025-03-12 RX ORDER — OXYTOCIN/0.9 % SODIUM CHLORIDE 30/500 ML
999 PLASTIC BAG, INJECTION (ML) INTRAVENOUS ONCE
OUTPATIENT
Start: 2025-03-12 | End: 2025-03-12

## 2025-03-12 RX ORDER — PROMETHAZINE HYDROCHLORIDE 12.5 MG/1
25 TABLET ORAL EVERY 6 HOURS PRN
OUTPATIENT
Start: 2025-03-12

## 2025-03-12 RX ORDER — ACETAMINOPHEN 325 MG/1
650 TABLET ORAL ONCE AS NEEDED
OUTPATIENT
Start: 2025-03-12

## 2025-03-12 RX ORDER — FAMOTIDINE 10 MG/ML
20 INJECTION, SOLUTION INTRAVENOUS 2 TIMES DAILY PRN
OUTPATIENT
Start: 2025-03-12

## 2025-03-12 RX ORDER — FAMOTIDINE 10 MG
20 TABLET ORAL 2 TIMES DAILY PRN
OUTPATIENT
Start: 2025-03-12

## 2025-03-12 RX ORDER — PROMETHAZINE HYDROCHLORIDE 12.5 MG/1
12.5 TABLET ORAL EVERY 6 HOURS PRN
OUTPATIENT
Start: 2025-03-12

## 2025-03-12 RX ORDER — FAMOTIDINE 10 MG/ML
20 INJECTION, SOLUTION INTRAVENOUS ONCE AS NEEDED
OUTPATIENT
Start: 2025-03-12

## 2025-03-12 RX ORDER — ONDANSETRON 4 MG/1
4 TABLET, ORALLY DISINTEGRATING ORAL EVERY 6 HOURS PRN
OUTPATIENT
Start: 2025-03-12

## 2025-03-12 RX ORDER — HYDROCODONE BITARTRATE AND ACETAMINOPHEN 10; 325 MG/1; MG/1
1 TABLET ORAL EVERY 4 HOURS PRN
Refills: 0 | OUTPATIENT
Start: 2025-03-12 | End: 2025-03-19

## 2025-03-12 RX ORDER — LIDOCAINE HYDROCHLORIDE 10 MG/ML
0.5 INJECTION, SOLUTION EPIDURAL; INFILTRATION; INTRACAUDAL; PERINEURAL ONCE AS NEEDED
OUTPATIENT
Start: 2025-03-12

## 2025-03-12 RX ORDER — SODIUM CHLORIDE 0.9 % (FLUSH) 0.9 %
10 SYRINGE (ML) INJECTION EVERY 12 HOURS SCHEDULED
OUTPATIENT
Start: 2025-03-12

## 2025-03-12 RX ORDER — OXYTOCIN/0.9 % SODIUM CHLORIDE 30/500 ML
2 PLASTIC BAG, INJECTION (ML) INTRAVENOUS
OUTPATIENT
Start: 2025-03-12

## 2025-03-12 RX ORDER — METHYLERGONOVINE MALEATE 0.2 MG/ML
200 INJECTION INTRAVENOUS ONCE AS NEEDED
OUTPATIENT
Start: 2025-03-12

## 2025-03-12 RX ORDER — OXYTOCIN/0.9 % SODIUM CHLORIDE 30/500 ML
250 PLASTIC BAG, INJECTION (ML) INTRAVENOUS CONTINUOUS
OUTPATIENT
Start: 2025-03-12 | End: 2025-03-12

## 2025-03-12 NOTE — TELEPHONE ENCOUNTER
I called the pt to see if she could come in any time between 8:30 am and 11:45 am.  Dr. Yang wanted to see if she could come sooner today.  I had to leave a voicemail and asked her to call back to let us know if she could come earlier, and if so, what time she could come in.

## 2025-03-13 ENCOUNTER — HOSPITAL ENCOUNTER (OUTPATIENT)
Facility: HOSPITAL | Age: 20
Discharge: HOME | End: 2025-03-13
Attending: OBSTETRICS & GYNECOLOGY | Admitting: OBSTETRICS & GYNECOLOGY
Payer: COMMERCIAL

## 2025-03-13 ENCOUNTER — PATIENT MESSAGE (OUTPATIENT)
Dept: OBSTETRICS AND GYNECOLOGY | Facility: CLINIC | Age: 20
End: 2025-03-13
Payer: COMMERCIAL

## 2025-03-13 VITALS
DIASTOLIC BLOOD PRESSURE: 83 MMHG | TEMPERATURE: 97.9 F | HEART RATE: 115 BPM | RESPIRATION RATE: 18 BRPM | SYSTOLIC BLOOD PRESSURE: 129 MMHG

## 2025-03-13 LAB
BILIRUB BLD-MCNC: NEGATIVE MG/DL
CLARITY, POC: CLEAR
COLOR UR: YELLOW
GLUCOSE UR STRIP-MCNC: NEGATIVE MG/DL
KETONES UR QL: ABNORMAL
LEUKOCYTE EST, POC: ABNORMAL
NITRITE UR-MCNC: NEGATIVE MG/ML
PH UR: 6 [PH] (ref 5–8)
PROT UR STRIP-MCNC: NEGATIVE MG/DL
RBC # UR STRIP: NEGATIVE /UL
SP GR UR: 1.02 (ref 1–1.03)
UROBILINOGEN UR QL: ABNORMAL

## 2025-03-13 PROCEDURE — G0463 HOSPITAL OUTPT CLINIC VISIT: HCPCS

## 2025-03-13 PROCEDURE — 59025 FETAL NON-STRESS TEST: CPT

## 2025-03-13 PROCEDURE — 81002 URINALYSIS NONAUTO W/O SCOPE: CPT | Performed by: OBSTETRICS & GYNECOLOGY

## 2025-03-13 NOTE — TELEPHONE ENCOUNTER
I tried to call the pt to get more information from her.  I left a voicemail message asking her to call the office but I will go ahead and send a MyChart message as well.

## 2025-03-13 NOTE — NON STRESS TEST
Obstetrical Non-stress Test Interpretation     Name:  Washington Leonard  MRN: [id number]    19 y.o. female  at 37w4d    Indication: NST- Contractions      Fetal Assessment  Fetal Movement: active  Fetal HR Assessment Method: external  Fetal HR (beats/min): 155  Fetal HR Baseline: normal range  Fetal HR Variability: moderate (amplitude range 6 to 25 bpm)  Fetal HR Accelerations: episodic, greater than/equal to 15 bpm, lasting at least 15 seconds  Fetal HR Decelerations: absent  Sinusoidal Pattern Present: absent    /83 (BP Location: Right arm, Patient Position: Sitting)   Pulse 115   Temp 97.9 °F (36.6 °C) (Oral)   Resp 18   LMP 2024 (Exact Date)     Reason for test: OB Triage (contractions)  Date of Test: 3/13/2025  Time frame of test: 2725-2108  RN NST Interpretation: Reactive      Mary Lucero RN  3/13/2025  15:15 EDT

## 2025-03-13 NOTE — OBED NOTES
ANAI Mckinney  Obstetric History and Physical    Chief Complaint   Patient presents with    Contractions       Subjective     Patient is a 19 y.o. female  currently at 37w4d, who presents with complaint of contractions.  She denies any loss of fluid or vaginal bleeding.  Positive fetal movements.    [place]  [facility] [place] care is benign.  Her previous obstetric/gynecological history is noted for is non-contributory.    The following portions of the patients history were reviewed and updated as appropriate: current medications, allergies, past medical history, past surgical history, past family history, past social history, and problem list .       Prenatal Information:  Prenatal Results       Initial Prenatal Labs       Test Value Reference Range Date Time    Hemoglobin  13.1 g/dL 12.0 - 15.9 24       12.9 g/dL 12.0 - 15.9 24    Hematocrit  40.2 % 34.0 - 46.6 24 162       38.7 % 34.0 - 46.6 24    Platelets  253 10*3/mm3 140 - 450 24 1625       255 10*3/mm3 140 - 450 24 211    Rubella IgG  1.66 index Immune >0.99 24 1625    Hepatitis B SAg  Non-Reactive  Non-Reactive 24 1625    Hepatitis C Ab  Non-Reactive  Non-Reactive 24 1625    RPR  Non Reactive  Non Reactive 24 1602       Non-Reactive  Non-Reactive 24 1625    T. Pallidum Ab         ABO  A   25 1547    Rh  Negative   25 1547    Antibody Screen  Negative   24 1625    HIV  Non-Reactive  Non-Reactive 24 1625    Urine Culture  <25,000 CFU/mL Normal Urogenital Hayley   10/29/24 1621       >100,000 CFU/mL Mixed Hayley Isolated   24 1620    Gonorrhea  Not Detected  Not Detected  24 1620    Chlamydia  Not Detected  Not Detected  24 1620    TSH        HgB A1c   5.20 % 4.80 - 5.60 24 1625    Varicella IgG        Hemoglobinopathy Fractionation  Comment   24 1625    Hemoglobinopathy (genetic testing)        Cystic fibrosis          Spinal muscular atrophy        Fragile X                  Fetal testing        Test Value Reference Range Date Time    NIPT        MSAFP        AFP-4                  2nd and 3rd Trimester       Test Value Reference Range Date Time    Hemoglobin (repeated)  13.3 g/dL 12.0 - 15.9 03/06/25 1108       11.5 g/dL 12.0 - 15.9 12/18/24 1602    Hematocrit (repeated)  41.0 % 34.0 - 46.6 03/06/25 1108       34.5 % 34.0 - 46.6 12/18/24 1602    Platelets   189 10*3/mm3 140 - 450 03/06/25 1108       227 10*3/mm3 140 - 450 12/18/24 1602       253 10*3/mm3 140 - 450 08/28/24 1625       255 10*3/mm3 140 - 450 07/31/24 2116    1 hour GTT   95 mg/dL 65 - 139 12/18/24 1602    Antibody Screen (repeated)  Negative   01/14/25 1547    3rd TM syphilis scrn (repeated)  RPR   Non Reactive  Non Reactive 12/18/24 1602    3rd TM syphilis scrn (repeated) TP-Ab        3rd TM syphilis screen TB-Ab (FTA)        Syphilis cascade test TP-Ab (EIA)        Syphilis cascade TPPA        GTT Fasting        GTT 1 Hr        GTT 2 Hr        GTT 3 Hr        Group B Strep  Negative  Negative 03/06/25 1102              Other testing        Test Value Reference Range Date Time    Parvo IgG         CMV IgG                   Drug Screening       Test Value Reference Range Date Time    Amphetamine Screen        Barbiturate Screen  Negative  Negative 08/28/24 1620    Benzodiazepine Screen  Negative  Negative 08/28/24 1620    Methadone Screen  Negative  Negative 08/28/24 1620    Phencyclidine Screen        Opiates Screen  Negative  Negative 08/28/24 1620    THC Screen  Negative  Negative 08/28/24 1620    Cocaine Screen  Negative  Negative 08/28/24 1620    Propoxyphene Screen        Buprenorphine Screen        Methamphetamine Screen        Oxycodone Screen  Negative  Negative 08/28/24 1620    Tricyclic Antidepressants Screen                  Legend    ^: Historical                          External Prenatal Results       Pregnancy Outside Results - Transcribed From  Office Records - See Scanned Records For Details       Test Value Date Time    ABO  A  01/14/25 1547    Rh  Negative  01/14/25 1547    Antibody Screen  Negative  01/14/25 1547       Negative  08/28/24 1625    Varicella IgG       Rubella  1.66 index 08/28/24 1625    Hgb  13.3 g/dL 03/06/25 1108       11.5 g/dL 12/18/24 1602       13.1 g/dL 08/28/24 1625       12.9 g/dL 07/31/24 2116    Hct  41.0 % 03/06/25 1108       34.5 % 12/18/24 1602       40.2 % 08/28/24 1625       38.7 % 07/31/24 2116    HgB A1c   5.20 % 08/28/24 1625    1h GTT  95 mg/dL 12/18/24 1602    3h GTT Fasting       3h GTT 1 hour       3h GTT 2 hour       3h GTT 3 hour        Gonorrhea (discrete)  Not Detected  08/28/24 1620    Chlamydia (discrete)  Not Detected  08/28/24 1620    RPR  Non Reactive  12/18/24 1602       Non-Reactive  08/28/24 1625    Syphils cascade: TP-Ab (FTA)       TP-Ab       TP-Ab (EIA)       TPPA       HBsAg  Non-Reactive  08/28/24 1625    Herpes Simplex Virus PCR       Herpes Simplex VIrus Culture       HIV  Non-Reactive  08/28/24 1625    Hep C RNA Quant PCR       Hep C Antibody  Non-Reactive  08/28/24 1625    AFP       NIPT       Cystic Fibrosis (Fab)       Cystic Fibroisis        Spinal Muscular atrophy       Fragile X       Group B Strep  Negative  03/06/25 1102    GBS Susceptibility to Clindamycin       GBS Susceptibility to Erythromycin       Fetal Fibronectin       Genetic Testing, Maternal Blood                 Drug Screening       Test Value Date Time    Urine Drug Screen       Amphetamine Screen       Barbiturate Screen  Negative  08/28/24 1620    Benzodiazepine Screen  Negative  08/28/24 1620    Methadone Screen  Negative  08/28/24 1620    Phencyclidine Screen       Opiates Screen  Negative  08/28/24 1620    THC Screen  Negative  08/28/24 1620    Cocaine Screen       Propoxyphene Screen       Buprenorphine Screen       Methamphetamine Screen       Oxycodone Screen  Negative  08/28/24 1620    Tricyclic Antidepressants  Screen                 Legend    ^: Historical                             Past OB History:     OB History    Para Term  AB Living   1 0 0 0 0 0   SAB IAB Ectopic Molar Multiple Live Births   0 0 0 0 0 0      # Outcome Date GA Lbr Ben/2nd Weight Sex Type Anes PTL Lv   1 Current                Past Medical History: Past Medical History:   Diagnosis Date    Anemia     Anxiety     Asthma 2018    Chlamydia     Depression     Heart murmur     Migraine     Syncope     Urinary tract infection       Past Surgical History Past Surgical History:   Procedure Laterality Date    WISDOM TOOTH EXTRACTION Bilateral       Family History: Family History   Problem Relation Age of Onset    Anxiety disorder Mother     Depression Mother     Miscarriages / Stillbirths Mother     Anxiety disorder Sister     Breast cancer Maternal Aunt     Cirrhosis Maternal Grandmother     Hypertension Maternal Grandmother     Diabetes Maternal Grandmother     Liver disease Maternal Grandmother     Vision loss Maternal Grandmother     Heart disease Maternal Grandfather     Diabetes Paternal Grandmother     Arrhythmia Paternal Grandmother         has a pacemaker    Hypertension Paternal Grandfather     Pulmonary embolism Paternal Grandfather     Coronary artery disease Paternal Grandfather     Asthma Brother     Ovarian cancer Neg Hx     Uterine cancer Neg Hx     Melanoma Neg Hx     Deep vein thrombosis Neg Hx       Social History:  reports that she has never smoked. She has never been exposed to tobacco smoke. She has never used smokeless tobacco.   reports no history of alcohol use.   reports no history of drug use.        General ROS: Pertinent items are noted in HPI    Objective       Vital Signs Range for the last 24 hours  Temperature: Temp:  [97.9 °F (36.6 °C)] 97.9 °F (36.6 °C)   Temp Source: Temp src: Oral   BP: BP: (129)/(83) 129/83   Pulse: Heart Rate:  [115] 115   Respirations: Resp:  [18] 18   SPO2:     O2 Amount (l/min):     O2  Devices     Weight:       Physical Examination: General appearance - alert, well appearing, and in no distress  Mental status - alert, oriented to person, place, and time  Chest - clear to auscultation, no wheezes, rales or rhonchi, symmetric air entry  Heart - normal rate, regular rhythm, normal S1, S2, no murmurs, rubs, clicks or gallops  Abdomen - soft, nontender, nondistended, no masses or organomegaly  Pelvic - normal external genitalia, vulva, vagina, cervix, uterus and adnexa  Back exam - full range of motion, no tenderness, palpable spasm or pain on motion  Neurological - alert, oriented, normal speech, no focal findings or movement disorder noted  Extremities - peripheral pulses normal, no pedal edema, no clubbing or cyanosis  Skin - normal coloration and turgor, no rashes, no suspicious skin lesions noted    Presentation: Vertex   Cervix: Exam by: Method: sterile vaginal exam performed   Dilation: Cervical Dilation (cm): 3   Effacement: Cervical Effacement: 70   Station:         Fetal Heart Rate Assessment   Method: Fetal HR Assessment Method: external   Beats/min: Fetal HR (beats/min): 155   Baseline: Fetal HR Baseline: normal range   Variability: Fetal HR Variability: moderate (amplitude range 6 to 25 bpm)   Accels: Fetal HR Accelerations: episodic, greater than/equal to 15 bpm, lasting at least 15 seconds   Decels: Fetal HR Decelerations: absent         Uterine Assessment   Method: Method: external tocotransducer   Frequency (min):     Ctx Count in 10 min:     Duration:     Intensity: Contraction Intensity: mild by palpation       Seaview Units:       GBS is negative      Assessment & Plan     Contractions during pregnancy      Assessment:  1.  Intrauterine pregnancy at 37w4d gestation with reactive fetal status.    2.  False labor    Plan:  Discharge home  Strict fetal kick count  Labor precautions      Amado Gordon MD  3/13/2025  17:41 EDT

## 2025-03-18 ENCOUNTER — HOSPITAL ENCOUNTER (INPATIENT)
Facility: HOSPITAL | Age: 20
LOS: 3 days | Discharge: HOME | End: 2025-03-21
Attending: OBSTETRICS & GYNECOLOGY | Admitting: OBSTETRICS & GYNECOLOGY
Payer: COMMERCIAL

## 2025-03-18 DIAGNOSIS — Z34.80 SUPERVISION OF OTHER NORMAL PREGNANCY, ANTEPARTUM: ICD-10-CM

## 2025-03-18 PROBLEM — Z37.9 NORMAL LABOR: Status: ACTIVE | Noted: 2025-03-18

## 2025-03-18 LAB — A1 MICROGLOB PLACENTAL VAG QL: NEGATIVE

## 2025-03-18 PROCEDURE — 84156 ASSAY OF PROTEIN URINE: CPT | Performed by: STUDENT IN AN ORGANIZED HEALTH CARE EDUCATION/TRAINING PROGRAM

## 2025-03-18 PROCEDURE — 86850 RBC ANTIBODY SCREEN: CPT | Performed by: OBSTETRICS & GYNECOLOGY

## 2025-03-18 PROCEDURE — 86900 BLOOD TYPING SEROLOGIC ABO: CPT | Performed by: OBSTETRICS & GYNECOLOGY

## 2025-03-18 PROCEDURE — 82570 ASSAY OF URINE CREATININE: CPT | Performed by: STUDENT IN AN ORGANIZED HEALTH CARE EDUCATION/TRAINING PROGRAM

## 2025-03-18 PROCEDURE — 86870 RBC ANTIBODY IDENTIFICATION: CPT | Performed by: OBSTETRICS & GYNECOLOGY

## 2025-03-18 PROCEDURE — 86780 TREPONEMA PALLIDUM: CPT | Performed by: OBSTETRICS & GYNECOLOGY

## 2025-03-18 PROCEDURE — 86901 BLOOD TYPING SEROLOGIC RH(D): CPT | Performed by: OBSTETRICS & GYNECOLOGY

## 2025-03-18 PROCEDURE — 80307 DRUG TEST PRSMV CHEM ANLYZR: CPT | Performed by: OBSTETRICS & GYNECOLOGY

## 2025-03-18 PROCEDURE — 85027 COMPLETE CBC AUTOMATED: CPT | Performed by: OBSTETRICS & GYNECOLOGY

## 2025-03-18 PROCEDURE — 84112 EVAL AMNIOTIC FLUID PROTEIN: CPT | Performed by: OBSTETRICS & GYNECOLOGY

## 2025-03-18 RX ORDER — ACETAMINOPHEN 325 MG/1
650 TABLET ORAL EVERY 4 HOURS PRN
Status: DISCONTINUED | OUTPATIENT
Start: 2025-03-18 | End: 2025-03-19 | Stop reason: SDUPTHER

## 2025-03-18 RX ORDER — OXYTOCIN/0.9 % SODIUM CHLORIDE 30/500 ML
1 PLASTIC BAG, INJECTION (ML) INTRAVENOUS
Status: DISCONTINUED | OUTPATIENT
Start: 2025-03-19 | End: 2025-03-19

## 2025-03-18 RX ORDER — SODIUM CHLORIDE, SODIUM LACTATE, POTASSIUM CHLORIDE, CALCIUM CHLORIDE 600; 310; 30; 20 MG/100ML; MG/100ML; MG/100ML; MG/100ML
125 INJECTION, SOLUTION INTRAVENOUS CONTINUOUS
Status: DISCONTINUED | OUTPATIENT
Start: 2025-03-19 | End: 2025-03-19

## 2025-03-18 RX ORDER — BUTORPHANOL TARTRATE 2 MG/ML
1 INJECTION, SOLUTION INTRAMUSCULAR; INTRAVENOUS
Status: DISCONTINUED | OUTPATIENT
Start: 2025-03-18 | End: 2025-03-19

## 2025-03-18 RX ORDER — SODIUM CHLORIDE 0.9 % (FLUSH) 0.9 %
10 SYRINGE (ML) INJECTION AS NEEDED
Status: DISCONTINUED | OUTPATIENT
Start: 2025-03-18 | End: 2025-03-19

## 2025-03-18 RX ORDER — LIDOCAINE HYDROCHLORIDE 10 MG/ML
0.5 INJECTION, SOLUTION EPIDURAL; INFILTRATION; INTRACAUDAL; PERINEURAL ONCE AS NEEDED
Status: DISCONTINUED | OUTPATIENT
Start: 2025-03-18 | End: 2025-03-19 | Stop reason: SDUPTHER

## 2025-03-18 RX ORDER — TERBUTALINE SULFATE 1 MG/ML
0.25 INJECTION SUBCUTANEOUS AS NEEDED
Status: DISCONTINUED | OUTPATIENT
Start: 2025-03-18 | End: 2025-03-19 | Stop reason: SDUPTHER

## 2025-03-18 RX ORDER — MAGNESIUM CARB/ALUMINUM HYDROX 105-160MG
30 TABLET,CHEWABLE ORAL ONCE
Status: COMPLETED | OUTPATIENT
Start: 2025-03-19 | End: 2025-03-19

## 2025-03-18 RX ORDER — SODIUM CHLORIDE 9 MG/ML
40 INJECTION, SOLUTION INTRAVENOUS AS NEEDED
Status: DISCONTINUED | OUTPATIENT
Start: 2025-03-18 | End: 2025-03-19

## 2025-03-18 RX ORDER — ONDANSETRON 2 MG/ML
4 INJECTION INTRAMUSCULAR; INTRAVENOUS EVERY 6 HOURS PRN
Status: DISCONTINUED | OUTPATIENT
Start: 2025-03-18 | End: 2025-03-19 | Stop reason: SDUPTHER

## 2025-03-18 RX ORDER — ONDANSETRON 4 MG/1
4 TABLET, ORALLY DISINTEGRATING ORAL EVERY 6 HOURS PRN
Status: DISCONTINUED | OUTPATIENT
Start: 2025-03-18 | End: 2025-03-19 | Stop reason: SDUPTHER

## 2025-03-18 RX ORDER — SODIUM CHLORIDE 0.9 % (FLUSH) 0.9 %
10 SYRINGE (ML) INJECTION EVERY 12 HOURS SCHEDULED
Status: DISCONTINUED | OUTPATIENT
Start: 2025-03-19 | End: 2025-03-19

## 2025-03-19 ENCOUNTER — ANESTHESIA EVENT (OUTPATIENT)
Dept: LABOR AND DELIVERY | Facility: HOSPITAL | Age: 20
End: 2025-03-19
Payer: COMMERCIAL

## 2025-03-19 ENCOUNTER — ANESTHESIA (OUTPATIENT)
Dept: LABOR AND DELIVERY | Facility: HOSPITAL | Age: 20
End: 2025-03-19
Payer: COMMERCIAL

## 2025-03-19 PROBLEM — Z34.90 ENCOUNTER FOR INDUCTION OF LABOR: Status: ACTIVE | Noted: 2025-03-19

## 2025-03-19 PROBLEM — L91.8 SKIN TAG: Status: ACTIVE | Noted: 2025-03-19

## 2025-03-19 PROBLEM — Z72.89 CURRENT EVERY DAY VAPING: Status: ACTIVE | Noted: 2025-03-19

## 2025-03-19 LAB
ABO GROUP BLD: NORMAL
ALBUMIN SERPL-MCNC: 3.6 G/DL (ref 3.5–5.2)
ALBUMIN/GLOB SERPL: 1.2 G/DL
ALP SERPL-CCNC: 159 U/L (ref 39–117)
ALT SERPL W P-5'-P-CCNC: 6 U/L (ref 1–33)
AMPHET+METHAMPHET UR QL: NEGATIVE
AMPHETAMINES UR QL: NEGATIVE
ANION GAP SERPL CALCULATED.3IONS-SCNC: 10.5 MMOL/L (ref 5–15)
AST SERPL-CCNC: 14 U/L (ref 1–32)
ATMOSPHERIC PRESS: 734.9 MMHG
ATMOSPHERIC PRESS: 736.1 MMHG
BARBITURATES UR QL SCN: NEGATIVE
BASE EXCESS BLDCOA CALC-SCNC: -5.6 MMOL/L (ref -2–2)
BASE EXCESS BLDCOV CALC-SCNC: -4.3 MMOL/L (ref -30–30)
BDY SITE: ABNORMAL
BDY SITE: NORMAL
BENZODIAZ UR QL SCN: NEGATIVE
BILIRUB BLD-MCNC: NEGATIVE MG/DL
BILIRUB SERPL-MCNC: 0.2 MG/DL (ref 0–1.2)
BLD GP AB SCN SERPL QL: POSITIVE
BUN SERPL-MCNC: 5 MG/DL (ref 6–20)
BUN/CREAT SERPL: 11.4 (ref 7–25)
BUPRENORPHINE SERPL-MCNC: NEGATIVE NG/ML
CALCIUM SPEC-SCNC: 9.4 MG/DL (ref 8.6–10.5)
CANNABINOIDS SERPL QL: NEGATIVE
CHLORIDE SERPL-SCNC: 103 MMOL/L (ref 98–107)
CLARITY, POC: CLEAR
CO2 SERPL-SCNC: 21.5 MMOL/L (ref 22–29)
COCAINE UR QL: NEGATIVE
COLOR UR: YELLOW
CREAT SERPL-MCNC: 0.44 MG/DL (ref 0.57–1)
CREAT UR-MCNC: 58.4 MG/DL
DEPRECATED RDW RBC AUTO: 42.2 FL (ref 37–54)
EGFRCR SERPLBLD CKD-EPI 2021: 142.2 ML/MIN/1.73
ERYTHROCYTE [DISTWIDTH] IN BLOOD BY AUTOMATED COUNT: 13.2 % (ref 12.3–15.4)
FENTANYL UR-MCNC: NEGATIVE NG/ML
GLOBULIN UR ELPH-MCNC: 2.9 GM/DL
GLUCOSE SERPL-MCNC: 99 MG/DL (ref 65–99)
GLUCOSE UR STRIP-MCNC: NEGATIVE MG/DL
HCO3 BLDCOA-SCNC: 23.4 MMOL/L
HCO3 BLDCOV-SCNC: 20.6 MMOL/L
HCT VFR BLD AUTO: 37.4 % (ref 34–46.6)
HGB BLD-MCNC: 12.5 G/DL (ref 12–15.9)
INHALED O2 CONCENTRATION: 21 %
INHALED O2 CONCENTRATION: 21 %
KETONES UR QL: ABNORMAL
LEUKOCYTE EST, POC: ABNORMAL
MCH RBC QN AUTO: 29.3 PG (ref 26.6–33)
MCHC RBC AUTO-ENTMCNC: 33.4 G/DL (ref 31.5–35.7)
MCV RBC AUTO: 87.8 FL (ref 79–97)
METHADONE UR QL SCN: NEGATIVE
MODALITY: ABNORMAL
MODALITY: NORMAL
NITRITE UR-MCNC: NEGATIVE MG/ML
OPIATES UR QL: NEGATIVE
OXYCODONE UR QL SCN: NEGATIVE
PCO2 BLDCOA: 59.2 MMHG (ref 33–49)
PCO2 BLDCOV: 36.7 MM HG (ref 35–51.3)
PCP UR QL SCN: NEGATIVE
PH BLDCOA: 7.21 PH UNITS (ref 7.18–7.34)
PH BLDCOV: 7.36 PH UNITS (ref 7.26–7.4)
PH UR: 6 [PH] (ref 5–8)
PLATELET # BLD AUTO: 195 10*3/MM3 (ref 140–450)
PMV BLD AUTO: 10 FL (ref 6–12)
PO2 BLDCOA: 24.6 MMHG
PO2 BLDCOV: 34.4 MM HG (ref 19–39)
POTASSIUM SERPL-SCNC: 3.8 MMOL/L (ref 3.5–5.2)
PROT ?TM UR-MCNC: 8.7 MG/DL
PROT SERPL-MCNC: 6.5 G/DL (ref 6–8.5)
PROT UR STRIP-MCNC: NEGATIVE MG/DL
PROT/CREAT UR: 0.15 MG/G{CREAT}
RBC # BLD AUTO: 4.26 10*6/MM3 (ref 3.77–5.28)
RBC # UR STRIP: NEGATIVE /UL
RESIDUAL RHIG DETECTED: NORMAL
RH BLD: NEGATIVE
SAO2 % BLDCOA: 31.9 %
SAO2 % BLDCOV: 64 %
SODIUM SERPL-SCNC: 135 MMOL/L (ref 136–145)
SP GR UR: 1.01 (ref 1–1.03)
T&S EXPIRATION DATE: NORMAL
TREPONEMA PALLIDUM IGG+IGM AB [PRESENCE] IN SERUM OR PLASMA BY IMMUNOASSAY: NORMAL
TRICYCLICS UR QL SCN: NEGATIVE
UROBILINOGEN UR QL: ABNORMAL
WBC NRBC COR # BLD AUTO: 12.45 10*3/MM3 (ref 3.4–10.8)

## 2025-03-19 PROCEDURE — 0KQM0ZZ REPAIR PERINEUM MUSCLE, OPEN APPROACH: ICD-10-PCS | Performed by: OBSTETRICS & GYNECOLOGY

## 2025-03-19 PROCEDURE — 25010000002 FENTANYL CITRATE (PF) 50 MCG/ML SOLUTION: Performed by: NURSE ANESTHETIST, CERTIFIED REGISTERED

## 2025-03-19 PROCEDURE — C1755 CATHETER, INTRASPINAL: HCPCS | Performed by: NURSE ANESTHETIST, CERTIFIED REGISTERED

## 2025-03-19 PROCEDURE — 25810000003 LACTATED RINGERS PER 1000 ML: Performed by: STUDENT IN AN ORGANIZED HEALTH CARE EDUCATION/TRAINING PROGRAM

## 2025-03-19 PROCEDURE — 80053 COMPREHEN METABOLIC PANEL: CPT | Performed by: STUDENT IN AN ORGANIZED HEALTH CARE EDUCATION/TRAINING PROGRAM

## 2025-03-19 PROCEDURE — 25810000003 SODIUM CHLORIDE 0.9 % SOLUTION: Performed by: OBSTETRICS & GYNECOLOGY

## 2025-03-19 PROCEDURE — 25010000002 ROPIVACAINE PER 1 MG: Performed by: NURSE ANESTHETIST, CERTIFIED REGISTERED

## 2025-03-19 PROCEDURE — 10907ZC DRAINAGE OF AMNIOTIC FLUID, THERAPEUTIC FROM PRODUCTS OF CONCEPTION, VIA NATURAL OR ARTIFICIAL OPENING: ICD-10-PCS | Performed by: OBSTETRICS & GYNECOLOGY

## 2025-03-19 PROCEDURE — 25010000002 OXYTOCIN PER 10 UNITS: Performed by: OBSTETRICS & GYNECOLOGY

## 2025-03-19 PROCEDURE — 51702 INSERT TEMP BLADDER CATH: CPT

## 2025-03-19 PROCEDURE — 82803 BLOOD GASES ANY COMBINATION: CPT

## 2025-03-19 PROCEDURE — 0HBAXZZ EXCISION OF INGUINAL SKIN, EXTERNAL APPROACH: ICD-10-PCS | Performed by: OBSTETRICS & GYNECOLOGY

## 2025-03-19 PROCEDURE — 81002 URINALYSIS NONAUTO W/O SCOPE: CPT | Performed by: OBSTETRICS & GYNECOLOGY

## 2025-03-19 PROCEDURE — 59410 OBSTETRICAL CARE: CPT | Performed by: OBSTETRICS & GYNECOLOGY

## 2025-03-19 PROCEDURE — 88304 TISSUE EXAM BY PATHOLOGIST: CPT | Performed by: OBSTETRICS & GYNECOLOGY

## 2025-03-19 PROCEDURE — 25810000003 LACTATED RINGERS PER 1000 ML: Performed by: OBSTETRICS & GYNECOLOGY

## 2025-03-19 RX ORDER — ONDANSETRON 2 MG/ML
4 INJECTION INTRAMUSCULAR; INTRAVENOUS EVERY 6 HOURS PRN
Status: DISCONTINUED | OUTPATIENT
Start: 2025-03-19 | End: 2025-03-19

## 2025-03-19 RX ORDER — ACETAMINOPHEN 325 MG/1
650 TABLET ORAL ONCE AS NEEDED
Status: DISCONTINUED | OUTPATIENT
Start: 2025-03-19 | End: 2025-03-19 | Stop reason: HOSPADM

## 2025-03-19 RX ORDER — ACETAMINOPHEN 325 MG/1
650 TABLET ORAL EVERY 6 HOURS
Status: DISCONTINUED | OUTPATIENT
Start: 2025-03-19 | End: 2025-03-19 | Stop reason: HOSPADM

## 2025-03-19 RX ORDER — SODIUM CHLORIDE, SODIUM LACTATE, POTASSIUM CHLORIDE, CALCIUM CHLORIDE 600; 310; 30; 20 MG/100ML; MG/100ML; MG/100ML; MG/100ML
125 INJECTION, SOLUTION INTRAVENOUS CONTINUOUS
Status: DISCONTINUED | OUTPATIENT
Start: 2025-03-19 | End: 2025-03-19

## 2025-03-19 RX ORDER — ONDANSETRON 2 MG/ML
4 INJECTION INTRAMUSCULAR; INTRAVENOUS EVERY 6 HOURS PRN
Status: DISCONTINUED | OUTPATIENT
Start: 2025-03-19 | End: 2025-03-19 | Stop reason: HOSPADM

## 2025-03-19 RX ORDER — FENTANYL CITRATE 50 UG/ML
INJECTION, SOLUTION INTRAMUSCULAR; INTRAVENOUS
Status: COMPLETED
Start: 2025-03-19 | End: 2025-03-19

## 2025-03-19 RX ORDER — LIDOCAINE HYDROCHLORIDE AND EPINEPHRINE 15; 5 MG/ML; UG/ML
INJECTION, SOLUTION EPIDURAL
Status: COMPLETED | OUTPATIENT
Start: 2025-03-19 | End: 2025-03-19

## 2025-03-19 RX ORDER — SODIUM CHLORIDE 0.9 % (FLUSH) 0.9 %
10 SYRINGE (ML) INJECTION EVERY 12 HOURS SCHEDULED
Status: DISCONTINUED | OUTPATIENT
Start: 2025-03-19 | End: 2025-03-19

## 2025-03-19 RX ORDER — PROMETHAZINE HYDROCHLORIDE 25 MG/1
25 TABLET ORAL EVERY 6 HOURS PRN
Status: DISCONTINUED | OUTPATIENT
Start: 2025-03-19 | End: 2025-03-19 | Stop reason: HOSPADM

## 2025-03-19 RX ORDER — OXYTOCIN/0.9 % SODIUM CHLORIDE 30/500 ML
999 PLASTIC BAG, INJECTION (ML) INTRAVENOUS ONCE
Status: DISCONTINUED | OUTPATIENT
Start: 2025-03-19 | End: 2025-03-19 | Stop reason: HOSPADM

## 2025-03-19 RX ORDER — IBUPROFEN 600 MG/1
600 TABLET, FILM COATED ORAL EVERY 6 HOURS
Status: DISCONTINUED | OUTPATIENT
Start: 2025-03-19 | End: 2025-03-19

## 2025-03-19 RX ORDER — POLYETHYLENE GLYCOL 3350 17 G/17G
17 POWDER, FOR SOLUTION ORAL DAILY PRN
Status: DISCONTINUED | OUTPATIENT
Start: 2025-03-19 | End: 2025-03-21 | Stop reason: HOSPADM

## 2025-03-19 RX ORDER — ROPIVACAINE HYDROCHLORIDE 2 MG/ML
INJECTION, SOLUTION EPIDURAL; INFILTRATION; PERINEURAL
Status: COMPLETED | OUTPATIENT
Start: 2025-03-19 | End: 2025-03-19

## 2025-03-19 RX ORDER — OXYTOCIN/0.9 % SODIUM CHLORIDE 30/500 ML
125 PLASTIC BAG, INJECTION (ML) INTRAVENOUS CONTINUOUS PRN
Status: DISCONTINUED | OUTPATIENT
Start: 2025-03-19 | End: 2025-03-21 | Stop reason: HOSPADM

## 2025-03-19 RX ORDER — ONDANSETRON 4 MG/1
4 TABLET, ORALLY DISINTEGRATING ORAL EVERY 6 HOURS PRN
Status: DISCONTINUED | OUTPATIENT
Start: 2025-03-19 | End: 2025-03-19 | Stop reason: HOSPADM

## 2025-03-19 RX ORDER — IBUPROFEN 800 MG/1
800 TABLET, FILM COATED ORAL ONCE AS NEEDED
Status: DISCONTINUED | OUTPATIENT
Start: 2025-03-19 | End: 2025-03-19 | Stop reason: HOSPADM

## 2025-03-19 RX ORDER — OXYTOCIN/0.9 % SODIUM CHLORIDE 30/500 ML
250 PLASTIC BAG, INJECTION (ML) INTRAVENOUS CONTINUOUS
Status: DISCONTINUED | OUTPATIENT
Start: 2025-03-19 | End: 2025-03-19

## 2025-03-19 RX ORDER — DOCUSATE SODIUM 100 MG/1
100 CAPSULE, LIQUID FILLED ORAL DAILY
Status: DISCONTINUED | OUTPATIENT
Start: 2025-03-19 | End: 2025-03-21 | Stop reason: HOSPADM

## 2025-03-19 RX ORDER — TRAMADOL HYDROCHLORIDE 50 MG/1
50 TABLET ORAL EVERY 6 HOURS PRN
Status: DISCONTINUED | OUTPATIENT
Start: 2025-03-19 | End: 2025-03-21 | Stop reason: HOSPADM

## 2025-03-19 RX ORDER — MORPHINE SULFATE 2 MG/ML
5 INJECTION, SOLUTION INTRAMUSCULAR; INTRAVENOUS
Status: DISCONTINUED | OUTPATIENT
Start: 2025-03-19 | End: 2025-03-19

## 2025-03-19 RX ORDER — ESCITALOPRAM OXALATE 10 MG/1
10 TABLET ORAL NIGHTLY
Status: DISCONTINUED | OUTPATIENT
Start: 2025-03-19 | End: 2025-03-21 | Stop reason: HOSPADM

## 2025-03-19 RX ORDER — EPHEDRINE SULFATE 50 MG/ML
5 INJECTION, SOLUTION INTRAVENOUS
Status: DISCONTINUED | OUTPATIENT
Start: 2025-03-19 | End: 2025-03-19

## 2025-03-19 RX ORDER — MISOPROSTOL 200 UG/1
800 TABLET ORAL AS NEEDED
Status: DISCONTINUED | OUTPATIENT
Start: 2025-03-19 | End: 2025-03-19

## 2025-03-19 RX ORDER — ONDANSETRON 4 MG/1
4 TABLET, ORALLY DISINTEGRATING ORAL EVERY 6 HOURS PRN
Status: DISCONTINUED | OUTPATIENT
Start: 2025-03-19 | End: 2025-03-19

## 2025-03-19 RX ORDER — HYDROCODONE BITARTRATE AND ACETAMINOPHEN 10; 325 MG/1; MG/1
1 TABLET ORAL EVERY 4 HOURS PRN
Refills: 0 | Status: DISCONTINUED | OUTPATIENT
Start: 2025-03-19 | End: 2025-03-19 | Stop reason: HOSPADM

## 2025-03-19 RX ORDER — FAMOTIDINE 10 MG/ML
20 INJECTION, SOLUTION INTRAVENOUS 2 TIMES DAILY PRN
Status: DISCONTINUED | OUTPATIENT
Start: 2025-03-19 | End: 2025-03-19

## 2025-03-19 RX ORDER — HYDROCODONE BITARTRATE AND ACETAMINOPHEN 5; 325 MG/1; MG/1
1 TABLET ORAL EVERY 4 HOURS PRN
Refills: 0 | Status: DISCONTINUED | OUTPATIENT
Start: 2025-03-19 | End: 2025-03-19 | Stop reason: HOSPADM

## 2025-03-19 RX ORDER — METHYLERGONOVINE MALEATE 0.2 MG/ML
200 INJECTION INTRAVENOUS ONCE AS NEEDED
Status: DISCONTINUED | OUTPATIENT
Start: 2025-03-19 | End: 2025-03-19

## 2025-03-19 RX ORDER — OXYTOCIN/0.9 % SODIUM CHLORIDE 30/500 ML
999 PLASTIC BAG, INJECTION (ML) INTRAVENOUS ONCE
Status: DISCONTINUED | OUTPATIENT
Start: 2025-03-19 | End: 2025-03-19

## 2025-03-19 RX ORDER — IBUPROFEN 600 MG/1
600 TABLET, FILM COATED ORAL EVERY 6 HOURS PRN
Qty: 30 TABLET | Refills: 0 | Status: SHIPPED | OUTPATIENT
Start: 2025-03-19

## 2025-03-19 RX ORDER — ACETAMINOPHEN 325 MG/1
650 TABLET ORAL EVERY 6 HOURS PRN
Qty: 30 TABLET | Refills: 1 | Status: SHIPPED | OUTPATIENT
Start: 2025-03-19

## 2025-03-19 RX ORDER — FAMOTIDINE 20 MG/1
20 TABLET, FILM COATED ORAL 2 TIMES DAILY PRN
Status: DISCONTINUED | OUTPATIENT
Start: 2025-03-19 | End: 2025-03-19

## 2025-03-19 RX ORDER — PROMETHAZINE HYDROCHLORIDE 25 MG/1
12.5 TABLET ORAL EVERY 6 HOURS PRN
Status: DISCONTINUED | OUTPATIENT
Start: 2025-03-19 | End: 2025-03-19

## 2025-03-19 RX ORDER — FAMOTIDINE 10 MG/ML
20 INJECTION, SOLUTION INTRAVENOUS ONCE AS NEEDED
Status: DISCONTINUED | OUTPATIENT
Start: 2025-03-19 | End: 2025-03-19 | Stop reason: HOSPADM

## 2025-03-19 RX ORDER — ONDANSETRON 4 MG/1
4 TABLET, ORALLY DISINTEGRATING ORAL EVERY 8 HOURS PRN
Status: DISCONTINUED | OUTPATIENT
Start: 2025-03-19 | End: 2025-03-21 | Stop reason: HOSPADM

## 2025-03-19 RX ORDER — ESCITALOPRAM OXALATE 10 MG/1
10 TABLET ORAL NIGHTLY
Status: CANCELLED | OUTPATIENT
Start: 2025-03-19

## 2025-03-19 RX ORDER — MISOPROSTOL 200 UG/1
600 TABLET ORAL ONCE
Status: DISCONTINUED | OUTPATIENT
Start: 2025-03-19 | End: 2025-03-19

## 2025-03-19 RX ORDER — ACETAMINOPHEN 325 MG/1
650 TABLET ORAL EVERY 4 HOURS PRN
Status: DISCONTINUED | OUTPATIENT
Start: 2025-03-19 | End: 2025-03-19

## 2025-03-19 RX ORDER — TERBUTALINE SULFATE 1 MG/ML
0.25 INJECTION SUBCUTANEOUS AS NEEDED
Status: DISCONTINUED | OUTPATIENT
Start: 2025-03-19 | End: 2025-03-19

## 2025-03-19 RX ORDER — IBUPROFEN 600 MG/1
600 TABLET, FILM COATED ORAL EVERY 6 HOURS SCHEDULED
Status: DISCONTINUED | OUTPATIENT
Start: 2025-03-19 | End: 2025-03-19

## 2025-03-19 RX ORDER — FAMOTIDINE 20 MG/1
20 TABLET, FILM COATED ORAL ONCE AS NEEDED
Status: DISCONTINUED | OUTPATIENT
Start: 2025-03-19 | End: 2025-03-19 | Stop reason: HOSPADM

## 2025-03-19 RX ORDER — FENTANYL CITRATE 50 UG/ML
INJECTION, SOLUTION INTRAMUSCULAR; INTRAVENOUS
Status: COMPLETED | OUTPATIENT
Start: 2025-03-19 | End: 2025-03-19

## 2025-03-19 RX ORDER — IBUPROFEN 600 MG/1
600 TABLET, FILM COATED ORAL EVERY 6 HOURS
Status: DISCONTINUED | OUTPATIENT
Start: 2025-03-19 | End: 2025-03-19 | Stop reason: HOSPADM

## 2025-03-19 RX ORDER — CITRIC ACID/SODIUM CITRATE 334-500MG
30 SOLUTION, ORAL ORAL ONCE AS NEEDED
Status: DISCONTINUED | OUTPATIENT
Start: 2025-03-19 | End: 2025-03-19

## 2025-03-19 RX ORDER — CALCIUM CARBONATE 500 MG/1
2 TABLET, CHEWABLE ORAL 3 TIMES DAILY PRN
Status: DISCONTINUED | OUTPATIENT
Start: 2025-03-19 | End: 2025-03-21 | Stop reason: HOSPADM

## 2025-03-19 RX ORDER — OXYTOCIN/0.9 % SODIUM CHLORIDE 30/500 ML
2 PLASTIC BAG, INJECTION (ML) INTRAVENOUS
Status: DISCONTINUED | OUTPATIENT
Start: 2025-03-19 | End: 2025-03-19

## 2025-03-19 RX ORDER — ACETAMINOPHEN 325 MG/1
650 TABLET ORAL EVERY 6 HOURS
Status: DISCONTINUED | OUTPATIENT
Start: 2025-03-19 | End: 2025-03-21 | Stop reason: HOSPADM

## 2025-03-19 RX ORDER — SODIUM CHLORIDE 9 MG/ML
40 INJECTION, SOLUTION INTRAVENOUS AS NEEDED
Status: DISCONTINUED | OUTPATIENT
Start: 2025-03-19 | End: 2025-03-19

## 2025-03-19 RX ORDER — METOCLOPRAMIDE HYDROCHLORIDE 5 MG/ML
10 INJECTION INTRAMUSCULAR; INTRAVENOUS ONCE AS NEEDED
Status: DISCONTINUED | OUTPATIENT
Start: 2025-03-19 | End: 2025-03-19

## 2025-03-19 RX ORDER — FENTANYL/ROPIVACAINE/NS/PF 2MCG/ML-.2
PLASTIC BAG, INJECTION (ML) INJECTION
Status: COMPLETED
Start: 2025-03-19 | End: 2025-03-19

## 2025-03-19 RX ORDER — ESCITALOPRAM OXALATE 10 MG/1
10 TABLET ORAL DAILY
Status: DISCONTINUED | OUTPATIENT
Start: 2025-03-19 | End: 2025-03-19

## 2025-03-19 RX ORDER — OXYTOCIN/0.9 % SODIUM CHLORIDE 30/500 ML
250 PLASTIC BAG, INJECTION (ML) INTRAVENOUS CONTINUOUS
Status: ACTIVE | OUTPATIENT
Start: 2025-03-19 | End: 2025-03-19

## 2025-03-19 RX ORDER — PROMETHAZINE HYDROCHLORIDE 25 MG/1
12.5 TABLET ORAL EVERY 4 HOURS PRN
Status: DISCONTINUED | OUTPATIENT
Start: 2025-03-19 | End: 2025-03-21 | Stop reason: HOSPADM

## 2025-03-19 RX ORDER — IBUPROFEN 600 MG/1
600 TABLET, FILM COATED ORAL EVERY 6 HOURS SCHEDULED
Status: DISCONTINUED | OUTPATIENT
Start: 2025-03-20 | End: 2025-03-21 | Stop reason: HOSPADM

## 2025-03-19 RX ORDER — LIDOCAINE HYDROCHLORIDE 10 MG/ML
0.5 INJECTION, SOLUTION EPIDURAL; INFILTRATION; INTRACAUDAL; PERINEURAL ONCE AS NEEDED
Status: DISCONTINUED | OUTPATIENT
Start: 2025-03-19 | End: 2025-03-19

## 2025-03-19 RX ORDER — SODIUM CHLORIDE 0.9 % (FLUSH) 0.9 %
1-10 SYRINGE (ML) INJECTION AS NEEDED
Status: DISCONTINUED | OUTPATIENT
Start: 2025-03-19 | End: 2025-03-21 | Stop reason: HOSPADM

## 2025-03-19 RX ORDER — SODIUM CHLORIDE 0.9 % (FLUSH) 0.9 %
10 SYRINGE (ML) INJECTION AS NEEDED
Status: DISCONTINUED | OUTPATIENT
Start: 2025-03-19 | End: 2025-03-19

## 2025-03-19 RX ADMIN — FENTANYL CITRATE 100 MCG: 50 INJECTION, SOLUTION INTRAMUSCULAR; INTRAVENOUS at 03:12

## 2025-03-19 RX ADMIN — MINERAL OIL 30 ML: 1000 SOLUTION ORAL at 13:52

## 2025-03-19 RX ADMIN — MISOPROSTOL 400 MCG: 200 TABLET ORAL at 14:20

## 2025-03-19 RX ADMIN — SODIUM CHLORIDE 999 MILLI-UNITS/MIN: 9 INJECTION, SOLUTION INTRAVENOUS at 14:28

## 2025-03-19 RX ADMIN — Medication 10 ML/HR: at 03:14

## 2025-03-19 RX ADMIN — DOCUSATE SODIUM 100 MG: 100 CAPSULE, LIQUID FILLED ORAL at 18:38

## 2025-03-19 RX ADMIN — ESCITALOPRAM OXALATE 10 MG: 10 TABLET ORAL at 20:20

## 2025-03-19 RX ADMIN — EPHEDRINE SULFATE 5 MG: 50 INJECTION INTRAVENOUS at 08:13

## 2025-03-19 RX ADMIN — LIDOCAINE HYDROCHLORIDE AND EPINEPHRINE 3 ML: 15; 5 INJECTION, SOLUTION EPIDURAL at 03:08

## 2025-03-19 RX ADMIN — ACETAMINOPHEN 650 MG: 325 TABLET ORAL at 16:12

## 2025-03-19 RX ADMIN — BENZOCAINE AND LEVOMENTHOL: 200; 5 SPRAY TOPICAL at 20:22

## 2025-03-19 RX ADMIN — SODIUM CHLORIDE, SODIUM LACTATE, POTASSIUM CHLORIDE, CALCIUM CHLORIDE 125 ML/HR: 20; 30; 600; 310 INJECTION, SOLUTION INTRAVENOUS at 11:32

## 2025-03-19 RX ADMIN — SODIUM CHLORIDE, POTASSIUM CHLORIDE, SODIUM LACTATE AND CALCIUM CHLORIDE 125 ML/HR: 600; 310; 30; 20 INJECTION, SOLUTION INTRAVENOUS at 04:37

## 2025-03-19 RX ADMIN — SODIUM CHLORIDE 2 MILLI-UNITS/MIN: 9 INJECTION, SOLUTION INTRAVENOUS at 03:19

## 2025-03-19 RX ADMIN — Medication 10 ML: at 03:20

## 2025-03-19 RX ADMIN — WITCH HAZEL: 500 SOLUTION RECTAL; TOPICAL at 20:22

## 2025-03-19 RX ADMIN — ROPIVACAINE HYDROCHLORIDE 8 ML: 2 INJECTION, SOLUTION EPIDURAL; INFILTRATION; PERINEURAL at 03:12

## 2025-03-19 RX ADMIN — ACETAMINOPHEN 650 MG: 325 TABLET ORAL at 21:43

## 2025-03-19 NOTE — ANESTHESIA PREPROCEDURE EVALUATION
Anesthesia Evaluation     Patient summary reviewed   no history of anesthetic complications:   NPO Solid Status: N/A  NPO Liquid Status: N/A           Airway   Mallampati: II  TM distance: >3 FB  Neck ROM: full  No difficulty expected  Dental - normal exam     Pulmonary - normal exam   (+) asthma,  Cardiovascular - normal exam    (+) valvular problems/murmurs      Neuro/Psych  (+) headaches, syncope, psychiatric history Anxiety and Depression  GI/Hepatic/Renal/Endo - negative ROS     Musculoskeletal (-) negative ROS    Abdominal  - normal exam   Substance History - negative use     OB/GYN    (+) Pregnant        Other - negative ROS                   Anesthesia Plan    ASA 2     epidural       Anesthetic plan, risks, benefits, and alternatives have been provided, discussed and informed consent has been obtained with: patient.  Pre-procedure education provided  Plan discussed with CRNA.    CODE STATUS:    Code Status (Patient has no pulse and is not breathing): CPR (Attempt to Resuscitate)  Medical Interventions (Patient has pulse or is breathing): Full Support  Level Of Support Discussed With: Patient

## 2025-03-19 NOTE — DISCHARGE SUMMARY
OB Discharge Summary        Admit Date:  3/18/2025  Date of Delivery: 3/19/2025  Discharge Date: 25    Reason for Admission/Chief Complaint: Contractions    Final Diagnosis:  38+3, active labor  Pitocin augmentation  Anxiety and depression-Lexapro 10 mg  Current everyday vaping  Elevated blood pressures in labor, secondary to pain    Left groin intertriginous lesion excision x 3  To do at FU: Follow-up anxiety and depression, vaping cessation, follow-up pathology  Pending Results       Procedure [Order ID] Specimen - Date/Time    CBC (No Diff) [[id number]]     Specimen: Blood     Treponema pallidum AB w/Reflex RPR [[id number]]     Specimen: Blood             Antepartum:  Prenatal care is complicated by:  See above Final Diagnosis for list    Intrapartum/Delivery:  OB Surgeon:  Dr. Winnie Marte,   Anesthesia: Epidural  Delivery Type:   Perineum : Vaginal , 2nd degree laceration, and excision of left intertriginous groin lesions x 3  Feeding method: Breast    Infant: female infant; Everlee    Weight: No birth weight on file.     APGARS: 8  @ 1 minute / 9  @ 5 minutes    Hospital Course/Significant Findings:  Patient admitted by hospitalists on 3/18 for early labor.  Required Pitocin augmentation.  Early in her labor process had mildly elevated blood pressures thought to be secondary to pain.  Had no hypertensive symptoms and labs within normal limits.  After epidural, blood pressure within normal limits.  Progressed on the labor curve once AROM.  Pushed for approximately 1-1/2 hours.  Uncomplicated   On the day of discharge POSTPARTUM pt tolerating a regular diet, ambulating, pain well controlled, urinating spontaneously and lochia appropriate.   Vital signs were stable and afebrile.  Exam was within normal limits.  Fundus was below umbilicus and nontender.  Meeting discharge criteria and desired discharge home.  Postpartum instructions and FU reviewed and questions answered.    Results from last 7  days   Lab Units 03/18/25  2353   WBC 10*3/mm3 12.45*   HEMOGLOBIN g/dL 12.5   HEMATOCRIT % 37.4   PLATELETS 10*3/mm3 195       Results from last 7 days   Lab Units 03/19/25  0215   GLUCOSE mg/dL 99   CREATININE mg/dL 0.44*   SODIUM mmol/L 135*   POTASSIUM mmol/L 3.8   CHLORIDE mmol/L 103   AST (SGOT) U/L 14   ALT (SGPT) U/L 6       Results from last 7 days   Lab Units 03/18/25  2353   TREPONEMA PALLIDUM AB TOTAL  Non-Reactive       Results for orders placed or performed during the hospital encounter of 03/18/25   POC Urinalysis Dipstick    Collection Time: 03/18/25  9:30 PM    Specimen: Urine   Result Value Ref Range    Color Yellow Yellow, Straw, Dark Yellow, Linda    Clarity, UA Clear Clear    Glucose, UA Negative Negative mg/dL    Bilirubin Negative Negative    Ketones, UA Trace (A) Negative    Specific Gravity  1.015 1.005 - 1.030    Blood, UA Negative Negative    pH, Urine 6.0 5.0 - 8.0    Protein, POC Negative Negative mg/dL    Urobilinogen, UA 0.2 E.U./dL Normal, 0.2 E.U./dL    Leukocytes Small (1+) (A) Negative    Nitrite, UA Negative Negative   Rapid Assay For ROM - Amniotic Fluid, Amniotic Sac    Collection Time: 03/18/25 10:12 PM    Specimen: Amniotic Sac; Amniotic Fluid   Result Value Ref Range    Rupture of Membranes Negative Negative   CBC (No Diff)    Collection Time: 03/18/25 11:53 PM    Specimen: Blood   Result Value Ref Range    WBC 12.45 (H) 3.40 - 10.80 10*3/mm3    RBC 4.26 3.77 - 5.28 10*6/mm3    Hemoglobin 12.5 12.0 - 15.9 g/dL    Hematocrit 37.4 34.0 - 46.6 %    MCV 87.8 79.0 - 97.0 fL    MCH 29.3 26.6 - 33.0 pg    MCHC 33.4 31.5 - 35.7 g/dL    RDW 13.2 12.3 - 15.4 %    RDW-SD 42.2 37.0 - 54.0 fl    MPV 10.0 6.0 - 12.0 fL    Platelets 195 140 - 450 10*3/mm3   Treponema pallidum AB w/Reflex RPR    Collection Time: 03/18/25 11:53 PM    Specimen: Blood   Result Value Ref Range    Treponemal AB Total Non-Reactive Non-Reactive   Urine Drug Screen - Urine, Clean Catch    Collection Time: 03/18/25  11:53 PM    Specimen: Urine, Clean Catch   Result Value Ref Range    THC, Screen, Urine Negative Negative    Phencyclidine (PCP), Urine Negative Negative    Cocaine Screen, Urine Negative Negative    Methamphetamine, Ur Negative Negative    Opiate Screen Negative Negative    Amphetamine Screen, Urine Negative Negative    Benzodiazepine Screen, Urine Negative Negative    Tricyclic Antidepressants Screen Negative Negative    Methadone Screen, Urine Negative Negative    Barbiturates Screen, Urine Negative Negative    Oxycodone Screen, Urine Negative Negative    Buprenorphine, Screen, Urine Negative Negative   Fentanyl, Urine - Urine, Clean Catch    Collection Time: 03/18/25 11:53 PM    Specimen: Urine, Clean Catch   Result Value Ref Range    Fentanyl, Urine Negative Negative   Protein / Creatinine Ratio, Urine - Urine, Clean Catch    Collection Time: 03/18/25 11:53 PM    Specimen: Urine, Clean Catch   Result Value Ref Range    Creatinine, Urine 58.4 mg/dL    Total Protein, Urine 8.7 mg/dL    Protein/Creatinine Ratio, Urine 0.15    Type & Screen    Collection Time: 03/18/25 11:53 PM    Specimen: Blood   Result Value Ref Range    ABO Type A     RH type Negative     Antibody Screen Positive     T&S Expiration Date 3/21/2025 11:59:59 PM    Antibody Identification    Collection Time: 03/18/25 11:53 PM    Specimen: Blood   Result Value Ref Range    Residual RhIG Detected RESIDUAL RHIG DETECTED    Comprehensive Metabolic Panel    Collection Time: 03/19/25  2:15 AM    Specimen: Blood   Result Value Ref Range    Glucose 99 65 - 99 mg/dL    BUN 5 (L) 6 - 20 mg/dL    Creatinine 0.44 (L) 0.57 - 1.00 mg/dL    Sodium 135 (L) 136 - 145 mmol/L    Potassium 3.8 3.5 - 5.2 mmol/L    Chloride 103 98 - 107 mmol/L    CO2 21.5 (L) 22.0 - 29.0 mmol/L    Calcium 9.4 8.6 - 10.5 mg/dL    Total Protein 6.5 6.0 - 8.5 g/dL    Albumin 3.6 3.5 - 5.2 g/dL    ALT (SGPT) 6 1 - 33 U/L    AST (SGOT) 14 1 - 32 U/L    Alkaline Phosphatase 159 (H) 39 - 117  "U/L    Total Bilirubin 0.2 0.0 - 1.2 mg/dL    Globulin 2.9 gm/dL    A/G Ratio 1.2 g/dL    BUN/Creatinine Ratio 11.4 7.0 - 25.0    Anion Gap 10.5 5.0 - 15.0 mmol/L    eGFR 142.2 >60.0 mL/min/1.73   Blood Gas, Venous, Cord    Collection Time: 03/19/25  2:07 PM    Specimen: Umbilical Cord; Cord Blood Venous   Result Value Ref Range    Site Arterial Line     pH, Cord Venous 7.357 7.260 - 7.400 pH Units    pCO2, Cord Venous 36.7 35.0 - 51.3 mm Hg    pO2, Cord Venous 34.4 19.0 - 39.0 mm Hg    HCO3, Cord Venous 20.6 mmol/L    Base Excess, Cord Venous -4.3 -30.0 - 30.0 mmol/L    O2 Sat, Cord Venous 64.0 %    Barometric Pressure for Blood Gas 736.1000 mmHg    Modality Room Air     FIO2 21 %   Blood Gas, Arterial, Cord    Collection Time: 03/19/25  2:07 PM    Specimen: Umbilical Cord; Cord Blood Arterial   Result Value Ref Range    Site Arterial Line     pH, Cord Arterial 7.21 7.18 - 7.34 pH Units    pCO2, Cord Arterial 59.2 (H) 33.0 - 49.0 mmHg    pO2, Cord Arterial 24.6 mmHg    HCO3, Cord Arterial 23.4 mmol/L    Base Exc, Cord Arterial -5.6 (L) -2.0 - 2.0 mmol/L    O2 Sat, Cord Arterial 31.9 %    Barometric Pressure for Blood Gas 734.9000 mmHg    Modality Room Air     FIO2 21 %       Lab Results   Component Value Date    CREATININEUR 58.4 03/18/2025    PROTEINUR 8.7 03/18/2025    UTPCR 0.15 03/18/2025                     No results found for: \"FIBRINOGEN\"    Discharge:         Discharge Medications        New Medications        Instructions Start Date   ibuprofen 600 MG tablet  Commonly known as: ADVIL,MOTRIN   600 mg, Oral, Every 6 Hours PRN             Changes to Medications        Instructions Start Date   acetaminophen 325 MG tablet  Commonly known as: Tylenol  What changed: You were already taking a medication with the same name, and this prescription was added. Make sure you understand how and when to take each.   650 mg, Oral, Every 6 Hours PRN      Tylenol 325 MG capsule  Generic drug: Acetaminophen  What changed: " Another medication with the same name was added. Make sure you understand how and when to take each.   Take  by mouth.             Continue These Medications        Instructions Start Date   albuterol sulfate  (90 Base) MCG/ACT inhaler  Commonly known as: PROVENTIL HFA;VENTOLIN HFA;PROAIR HFA   2 puffs, Inhalation, Every 4 Hours PRN      escitalopram 10 MG tablet  Commonly known as: LEXAPRO   10 mg, Oral, Daily      labetalol 100 MG tablet  Commonly known as: NORMODYNE   50 mg, Oral, 2 Times Daily PRN      PRENATAL 1 PO   Take  by mouth.             Stop These Medications      loratadine 10 MG tablet  Commonly known as: CLARITIN     ondansetron 4 MG tablet  Commonly known as: ZOFRAN                Disposition: Home  Diet: Regular    Activity: Pelvic rest 6 weeks    Condition at discharge: Good    Follow up with: Dr. Winnie Marte DO or provider of her choice    Follow up in: FU moods/depression/anxiety 2 weeks      Complications: None       Signature: Magali Stein MD       [facility]  [address] [place] [address] [place] [address]  Dept: [phone]  Dept Fax: [phone]  Loc: [phone]

## 2025-03-19 NOTE — NURSING NOTE
"Patient presenting to triage for complaints of pain in abdomen described as \"splitting in half\". Patient describes the pain as constant. Patient reports leaking fluid on 3/16/2025 all day but has since subsided. Patient denies any vaginal bleeding.     Amnisure collected and resulted as negative. SVE performed and revealed 4/70/-1. Urine dipped and results put in chart. NST initiated.     Dr. Gordon notified of patient arrival, amnisure results, SVE, NST, and lab results.     Patient to be admitted and managed for labor.   "

## 2025-03-19 NOTE — PROGRESS NOTES
OB Intrapartum Note    Subjective: No complaints, Pain controlled, Comfortable with epidural, would like epidural rate decreased, is very numb    Assuming care, arrived 10pm in early labor, 4cm.  Has had pit augmentation.  EFW 8 - 8 1/2 lbs by US and leopolds.  Mildly elev BP on arrival, nl now.  Denies HTN sxs.  Labs wnl.     Objective:  Baseline: Normal 110-160 bpm  Variability:   Moderate/Normal (amplitude 6-25 bpm)  Accelerations: Present (32 weeks+) 15 x 15 bpm  Decelerations: None  Contractions:  Regular, q2min.  Pit at 7    Cervical exam:    Dilation: 5cm    Effacement: 90%    Station: 0/Engaged    Impression:    Category I  Reassuring fetus, AROM, Clear fluid, Pain controlled    Plan:   Continue pitocin  Continue to monitor  Active labor positioning  Pelvis feels clinically adequate  I have discussed in detail with patient the current plan to include, but NLT, appropriate expectations for labor and delivery, to include possible length of time expected for delivery and hospital stay.  All questions have been answered to pts satisfaction and pt desires to proceed as noted.  Specific labor and delivery desires: none  Suspect slow progress secondary to not active phase yet.  DW pt and FOB.  Questions answered.        Electronically signed by Winnie Marte DO, 03/19/25, 7:42 AM EDT.

## 2025-03-19 NOTE — H&P
ANAI Mckinney  Obstetric History and Physical    Chief Complaint   Patient presents with    Abdominal Pain       Subjective     Patient is a 19 y.o. female  currently at 38w2d, who presents with complaint of contractions.  Patient also reported losing fluid but AmniSure was negative.  Positive fetal movements    PNC provided by:  [facility] [place]. Her prenatal care is benign.  Her previous obstetric/gynecological history is noted for is non-contributory.    The following portions of the patients history were reviewed and updated as appropriate: current medications, allergies, past medical history, past surgical history, past family history, past social history, and problem list .       Prenatal Information:  Prenatal Results       Initial Prenatal Labs       Test Value Reference Range Date Time    Hemoglobin  13.1 g/dL 12.0 - 15.9 24 1625       12.9 g/dL 12.0 - 15.9 24 211    Hematocrit  40.2 % 34.0 - 46.6 24 1625       38.7 % 34.0 - 46.6 24 211    Platelets  253 10*3/mm3 140 - 450 24 1625       255 10*3/mm3 140 - 450 24 211    Rubella IgG  1.66 index Immune >0.99 24 1625    Hepatitis B SAg  Non-Reactive  Non-Reactive 24 1625    Hepatitis C Ab  Non-Reactive  Non-Reactive 24 1625    RPR  Non Reactive  Non Reactive 24 1602       Non-Reactive  Non-Reactive 24 1625    T. Pallidum Ab         ABO  A   25 1547    Rh  Negative   25 1547    Antibody Screen  Negative   24 1625    HIV  Non-Reactive  Non-Reactive 24 1625    Urine Culture  <25,000 CFU/mL Normal Urogenital Hayley   10/29/24 1621       >100,000 CFU/mL Mixed Hayley Isolated   24 1620    Gonorrhea  Not Detected  Not Detected  24 1620    Chlamydia  Not Detected  Not Detected  24 1620    TSH        HgB A1c   5.20 % 4.80 - 5.60 24 1625    Varicella IgG        Hemoglobinopathy Fractionation  Comment   24 1625    Hemoglobinopathy (genetic testing)        Cystic  fibrosis         Spinal muscular atrophy        Fragile X                  Fetal testing        Test Value Reference Range Date Time    NIPT        MSAFP        AFP-4                  2nd and 3rd Trimester       Test Value Reference Range Date Time    Hemoglobin (repeated)  13.3 g/dL 12.0 - 15.9 03/06/25 1108       11.5 g/dL 12.0 - 15.9 12/18/24 1602    Hematocrit (repeated)  41.0 % 34.0 - 46.6 03/06/25 1108       34.5 % 34.0 - 46.6 12/18/24 1602    Platelets   189 10*3/mm3 140 - 450 03/06/25 1108       227 10*3/mm3 140 - 450 12/18/24 1602       253 10*3/mm3 140 - 450 08/28/24 1625       255 10*3/mm3 140 - 450 07/31/24 2116    1 hour GTT   95 mg/dL 65 - 139 12/18/24 1602    Antibody Screen (repeated)  Negative   01/14/25 1547    3rd TM syphilis scrn (repeated)  RPR   Non Reactive  Non Reactive 12/18/24 1602    3rd TM syphilis scrn (repeated) TP-Ab        3rd TM syphilis screen TB-Ab (FTA)        Syphilis cascade test TP-Ab (EIA)        Syphilis cascade TPPA        GTT Fasting        GTT 1 Hr        GTT 2 Hr        GTT 3 Hr        Group B Strep  Negative  Negative 03/06/25 1102              Other testing        Test Value Reference Range Date Time    [place]vo IgG         CMV IgG                   Drug Screening       Test Value Reference Range Date Time    Amphetamine Screen        Barbiturate Screen  Negative  Negative 08/28/24 1620    Benzodiazepine Screen  Negative  Negative 08/28/24 1620    Methadone Screen  Negative  Negative 08/28/24 1620    Phencyclidine Screen        Opiates Screen  Negative  Negative 08/28/24 1620    THC Screen  Negative  Negative 08/28/24 1620    Cocaine Screen  Negative  Negative 08/28/24 1620    Propoxyphene Screen        Buprenorphine Screen        Methamphetamine Screen        Oxycodone Screen  Negative  Negative 08/28/24 1620    Tricyclic Antidepressants Screen                  Legend    ^: Historical                          External Prenatal Results       Pregnancy Outside Results -  Transcribed From Office Records - See Scanned Records For Details       Test Value Date Time    ABO  A  01/14/25 1547    Rh  Negative  01/14/25 1547    Antibody Screen  Negative  01/14/25 1547       Negative  08/28/24 1625    Varicella IgG       Rubella  1.66 index 08/28/24 1625    Hgb  13.3 g/dL 03/06/25 1108       11.5 g/dL 12/18/24 1602       13.1 g/dL 08/28/24 1625       12.9 g/dL 07/31/24 2116    Hct  41.0 % 03/06/25 1108       34.5 % 12/18/24 1602       40.2 % 08/28/24 1625       38.7 % 07/31/24 2116    HgB A1c   5.20 % 08/28/24 1625    1h GTT  95 mg/dL 12/18/24 1602    3h GTT Fasting       3h GTT 1 hour       3h GTT 2 hour       3h GTT 3 hour        Gonorrhea (discrete)  Not Detected  08/28/24 1620    Chlamydia (discrete)  Not Detected  08/28/24 1620    RPR  Non Reactive  12/18/24 1602       Non-Reactive  08/28/24 1625    Syphils cascade: TP-Ab (FTA)       TP-Ab       TP-Ab (EIA)       TPPA       HBsAg  Non-Reactive  08/28/24 1625    Herpes Simplex Virus PCR       Herpes Simplex VIrus Culture       HIV  Non-Reactive  08/28/24 1625    Hep C RNA Quant PCR       Hep C Antibody  Non-Reactive  08/28/24 1625    AFP       NIPT       Cystic Fibrosis (Fab)       Cystic Fibroisis        Spinal Muscular atrophy       Fragile X       Group B Strep  Negative  03/06/25 1102    GBS Susceptibility to Clindamycin       GBS Susceptibility to Erythromycin       Fetal Fibronectin       Genetic Testing, Maternal Blood                 Drug Screening       Test Value Date Time    Urine Drug Screen       Amphetamine Screen       Barbiturate Screen  Negative  08/28/24 1620    Benzodiazepine Screen  Negative  08/28/24 1620    Methadone Screen  Negative  08/28/24 1620    Phencyclidine Screen       Opiates Screen  Negative  08/28/24 1620    THC Screen  Negative  08/28/24 1620    Cocaine Screen       Propoxyphene Screen       Buprenorphine Screen       Methamphetamine Screen       Oxycodone Screen  Negative  08/28/24 1620    Tricyclic  Antidepressants Screen                 Legend    ^: Historical                             Past OB History:     OB History    Para Term  AB Living   1 0 0 0 0 0   SAB IAB Ectopic Molar Multiple Live Births   0 0 0 0 0 0      # Outcome Date GA Lbr Ben/2nd Weight Sex Type Anes PTL Lv   1 Current                Past Medical History: Past Medical History:   Diagnosis Date    Anemia     Anxiety     Asthma 2018    Chlamydia     Depression     Heart murmur     Migraine     Syncope     Urinary tract infection       Past Surgical History Past Surgical History:   Procedure Laterality Date    WISDOM TOOTH EXTRACTION Bilateral       Family History: Family History   Problem Relation Age of Onset    Anxiety disorder Mother     Depression Mother     Miscarriages / Stillbirths Mother     Anxiety disorder Sister     Breast cancer Maternal Aunt     Cirrhosis Maternal Grandmother     Hypertension Maternal Grandmother     Diabetes Maternal Grandmother     Liver disease Maternal Grandmother     Vision loss Maternal Grandmother     Heart disease Maternal Grandfather     Diabetes Paternal Grandmother     Arrhythmia Paternal Grandmother         has a pacemaker    Hypertension Paternal Grandfather     Pulmonary embolism Paternal Grandfather     Coronary artery disease Paternal Grandfather     Asthma Brother     Ovarian cancer Neg Hx     Uterine cancer Neg Hx     Melanoma Neg Hx     Deep vein thrombosis Neg Hx       Social History:  reports that she has never smoked. She has never been exposed to tobacco smoke. She has never used smokeless tobacco.   reports no history of alcohol use.   reports no history of drug use.        General ROS: Pertinent items are noted in HPI    Objective       Vital Signs Range for the last 24 hours  Temperature: Temp:  [98 °F (36.7 °C)] 98 °F (36.7 °C)   Temp Source: Temp src: Oral   BP: BP: (120-139)/(76) 120/76   Pulse: Heart Rate:  [] 101   Respirations:     SPO2:     O2 Amount (l/min):      O2 Devices     Weight:       Physical Examination: General appearance - alert, well appearing, and in no distress  Mental status - alert, oriented to person, place, and time  Chest - clear to auscultation, no wheezes, rales or rhonchi, symmetric air entry  Heart - normal rate, regular rhythm, normal S1, S2, no murmurs, rubs, clicks or gallops  Abdomen - soft, nontender, nondistended, no masses or organomegaly  Pelvic - normal external genitalia, vulva, vagina, cervix, uterus and adnexa  Back exam - full range of motion, no tenderness, palpable spasm or pain on motion  Neurological - alert, oriented, normal speech, no focal findings or movement disorder noted  Extremities - peripheral pulses normal, no pedal edema, no clubbing or cyanosis  Skin - normal coloration and turgor, no rashes, no suspicious skin lesions noted    Presentation: Vertex   Cervix: Exam by: Method: sterile vaginal exam performed   Dilation: 45 cm   Effacement: 80%   Station: 0       Fetal Heart Rate Assessment   Method:     Beats/min:     Baseline:     Variability:     Accels:     Decels:           Uterine Assessment   Method:     Frequency (min):     Ctx Count in 10 min:     Duration:     Intensity:         [place] Units:       GBS is negative      Assessment & Plan       Normal labor        Assessment:  1.  Intrauterine pregnancy at 38w2d gestation with reactive fetal status.    2.  labor  without ROM  3.  Obstetrical history significant for is non-contributory.  4.  GBS status:   Group B Strep, DNA   Date Value Ref Range Status   03/06/2025 Negative Negative Final       Plan:  1. Vaginal anticipated  2. Plan of care has been reviewed with patient and patient agrees.   3.  Risks, benefits of treatment plan have been discussed.  4.  All questions have been answered.  5.  Pitocin for augmentation as needed      Amado Gordon MD  3/18/2025  23:24 EDT

## 2025-03-19 NOTE — LACTATION NOTE
LC in for initial visit. Infant was in skin-to-skin and had breast fed after delivery for about 48 minutes per mom and was still showing feeding cues. LC demonstrated proper position for holding infant and infant latched immediately, but was tongue thrusting mostly at the breast, did have moments of coordination. LC demonstrated how to massage breast to help move milk closer to nipple and how to hand express. Discussed attempting to feed baby every 2-3 hours but allowing unlimited access to breast with unlimited time on breast. Encouraged her to do awake skin to skin as much as possible. LC discussed normal  feeding behavior during the first few days of breastfeeding. I went over waking techniques and how to keep baby awake at breast. Mom states she has a Motif breast pump for home use ordered through insurance. Encouraged pt to call out as needed for LC/staff assistance.

## 2025-03-19 NOTE — ANESTHESIA PROCEDURE NOTES
Labor Epidural    Pre-sedation assessment completed: 3/19/2025 2:55 AM    Patient reassessed immediately prior to procedure    Patient location during procedure: OB  Start Time: 3/19/2025 2:57 AM  Stop Time: 3/19/2025 3:23 AM  Performed By  CRNA/CAA: Jayashree Davis CRNA  Preanesthetic Checklist  Completed: patient identified, IV checked, site marked, risks and benefits discussed, surgical consent, monitors and equipment checked, pre-op evaluation and timeout performed  Prep:  Pt Position:sitting  Sterile Tech:gloves and sterile barrier  Prep:povidone-iodine 7.5% surgical scrub  Monitoring:blood pressure monitoring and continuous pulse oximetry  Epidural Block Procedure:  Approach:midline  Guidance:landmark technique and palpation technique  Location:L3-L4  Needle Type:Tuohy  Needle Gauge:17 G  Loss of Resistance Medium: saline  Loss of Resistance: 5cm  Cath Depth at skin:10 cm  Paresthesia: none  Aspiration:negative  Test Dose:negative  Medication: ropivacaine (NAROPIN) 0.2 % injection - Injection   8 mL - 3/19/2025 3:12:00 AM  fentaNYL citrate (PF) (SUBLIMAZE) injection - Epidural   100 mcg - 3/19/2025 3:12:00 AM  lidocaine 1.5%-EPINEPHrine 1:200,000 (XYLOCAINE W/EPI) injection - Epidural   3 mL - 3/19/2025 3:08:00 AM  Number of Attempts: 1  Post Assessment:  Dressing:biopatch applied, occlusive dressing applied and secured with tape  Pt Tolerance:patient tolerated the procedure well with no apparent complications  Complications:no

## 2025-03-19 NOTE — PLAN OF CARE
Goal Outcome Evaluation:  Plan of Care Reviewed With: patient        Progress: improving  Outcome Evaluation: Patient admitted for augmentation, no acute adverse events at this time. Plan of care ongoing.

## 2025-03-19 NOTE — DISCHARGE INSTRUCTIONS
DR. LANIER'S POSTPARTUM DISCHARGE PRECAUTIONS and Answers to FAQs     NO SEX for SIX weeks.     NO TUB BATH or POOL for TWO week(s), shower only.  Sitz baths are fine.     STITCHES (if present):  wash them daily in the shower with soap and water (any type of soap is fine, it does not need to be antibacterial soap).  It is ok to gently put your finger in and around the vaginal area.  Look at your stitches (the ones on the outside) when you get home.  You will then know what is normal and can have a point of reference to compare it to if you start to have concerns.  REDNESS, PUS, increase in PAIN, FEVER or CHILLS are all reasons to be seen our office immediately.  Go to the ER, if it is after hours or a weekend.       VAGINAL BLEEDING:  may continue on and off over the next several weeks after delivery and may increase slightly once you go home.  You should not be bleeding more than 1 large pad soaked every hour or two.  Clots (even the size of a lemon or larger) may be normal as long as the bleeding is not heavy and the clots do not continue.       FEVER or CHILLS or NOT FEELING WELL: call our office.  If the office is closed, you need to be seen in acute care or ER.       CHEST PAIN or SHORTNESS OF BREATH/AIR: you need to GO TO THE NEAREST ER or CALL 911.      SWELLING: can increase over the next 7-10 days and then should slowly improve.  Your legs/ankles should be fairly similar in size.  A red, painful, hot, swollen leg (usually just one side) can be a sign of a blood clot and should be evaluated immediately.  Call our office.  If it is after hours or a weekend, you must be seen IMMEDIATELY IN THE ER.      ELEVATED BLOOD PRESSURE:  you need to contact us if you are having  persistent elevated BP systolic (top number) more than 155 or diastolic (bottom number) more than 95, or a headache (not relieved with rest, hydration or over the counter pain reliever), an increase in your swelling (usually hands and face),  changes in your vision (typically flashing white or black spots) or severe persistent pain in the location of the upper right side of your belly (under your right breast).  Call our office or go to ER if after hours or a weekend.     LACTATION QUESTIONS or CONCERNS?  Call [facility] Lactation Support [phone].     WORK and SCHOOL TIME OFF: depends on your specific delivery type, surrounding circumstances, and your work insurance/school rules.  If you have questions, please call Анна Miller at [phone] (ext. [phone]).  Or email Анна at [email or web address].  They will assist in required paperwork for you and/or family members.      Any further QUESTIONS or CONCERNS, please call [facility]EN[facility] Tejada at [phone].

## 2025-03-19 NOTE — PLAN OF CARE
Goal Outcome Evaluation:   Successful vaginal delivery @ 1349. Epidural catheter removed. Straight cath performed @ 1452 r/t boggy uterus voiding 550 mL. Firm w/o massage since then. Labial swelling bilaterally. Mother, FOB, and  bonding well.                                           NEW PATIENT VISIT     CHIEF COMPLAINT:   Chief Complaint   Patient presents with   • Skin Assessment     no areas of concern        HISTORY OF PRESENT ILLNESS: The patient is a 68 year old  male seen today as a self referral for skin check. He has no personal history of skin cancer but notes history of extensive sun exposure. He is now more regular with sun protection. He notes lots of scattered freckles and scattered benign keratoses but he denies any new, bleeding, changing or otherwise bothersome lesions.     REVIEW OF SYSTEMS:   No other skin concerns.    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   • h/o pseudoaneurysm 02/22/2007    right groin   • KERATOSIS SEBORRHEIC INFLAMED 02/12/2003    anterior neck, s/p excision   • Kidney Stone    • Lumbago     chronic   • Other abnormal blood chemistry     hyperglycemia   • Other and unspecified hyperlipidemia    • Spina bifida occulta    • Unspecified hemorrhoids without mention of complication         MEDICATIONS:   Current Outpatient Prescriptions   Medication Sig Dispense Refill   • lisinopril-hydrochlorothiazide (PRINZIDE,ZESTORETIC) 20-25 MG per tablet TAKE ONE TABLET BY MOUTH DAILY 90 tablet 1   • metoPROLOL (LOPRESSOR) 50 MG tablet TAKE ONE TABLET BY MOUTH TWICE DAILY 180 tablet 1   • simvastatin (ZOCOR) 40 MG tablet TAKE 1/2 TABLET BY MOUTH NIGHTLY 45 tablet 1   • vitamin - therapeutic multivitamins w/minerals (CENTRUM SILVER,THERA-M) Tab Take 1 tablet by mouth daily.     • aspirin 81 MG tablet Take 1 tablet by mouth daily.     • Omega-3 Fatty Acids (FISH OIL) 1200 MG capsule Take 1,200 mg by mouth daily.       No current  facility-administered medications for this visit.         ALLERGIES:   ALLERGIES:   Allergen Reactions   • Morphine Sulfate NAUSEA        FAMILY HISTORY:   Family History   Problem Relation Age of Onset   • Heart Father      in his 60s   • Heart Brother      CAD, first event in his 40s, two CABG surgeries   • Diabetes Brother    • Hypertension Brother    • Heart Mother      cardiac death at an old age        SOCIAL HISTORY:   Social History     Social History   • Marital status:      Spouse name: N/A   • Number of children: N/A   • Years of education: N/A     Occupational History   • consultant [employer]     Social History Main Topics   • Smoking status: Never Smoker   • Smokeless tobacco: Never Used   • Alcohol use Yes      Comment: rare   • Drug use: No   • Sexual activity: Not Asked     Other Topics Concern   • None     Social History Narrative        PHYSICAL EXAM:   GENERAL:  pleasant, alert, NAD    A full body skin and hair exam of the scalp, face, neck, back, chest, right and left arm and leg, abdomen, hands, and feet was performed today.   Findings include:   -Generally tan  -Right thigh with 1.2 cm pink pearly plaque with few pigment dots  -Numerous scattered tan macules with feathery borders over sun exposed sites  -Scattered tan-brown hyperkeratotic stuck-on appearing papules    ASSESSMENT AND PLAN:   1. Lesion right thigh. Suspect BCC   -Shave biopsy as described  2. Lentigines. Reassured. Encouraged sun protection.  3. Seborrheic keratoses. Reassured.  4. Return to clinic in 6 month(s), sooner pending results.    SHAVE BIOPSY       Verbal informed consent was obtained after discussing risks including pain, bleeding, infection, recurrence, and scarring. The right thigh (site) was sterilely prepped with alcohol, which was allowed to dry completely, and then locally infiltrated with 1% lidocaine with  epinephrine, 1 ml total. A shave biopsy was obtained using a blue blade and  the specimen was sent to pathology. Hemostasis was obtained with aluminum chloride. Vaseline ointment and a clean dressing were applied. The patient tolerated the procedure well without complications. Verbal and written wound care instructions were given.

## 2025-03-19 NOTE — L&D DELIVERY NOTE
VAGINAL DELIVERY NOTE          Date: 3/19/2025  Time:  1:49 PM  GA: 38w3d    Infant: female infant   3280 g (7 lb 3.7 oz)    APGARS: 8  @ 1 minute / 9  @ 5 minutes    Delivery: The patient progressed to complete, complete and pushed for approximately 90 minutes to deliver a viable infant in cephalic presentation weighing the above weight and above Apgars.  There was no nuchal cord.   The mouth and nares were bulb suctioned on the perineum.  The right anterior and left posterior shoulders were easily delivered without traction.  The remainder of the body delivered.  The infant was vigorous.  Delayed cord clamping for 60 seconds.  The cord was then clamped and cut.  Infant was placed on the maternal chest for recovery.  The placenta delivered intact with the assistance of fundal massage and maternal pushing efforts.      On full evaluation of the perineum, vagina, cervix and rectum a repair was placed.  Small second-degree vaginal laceration to introitus.  It was repaired with 3-0 Vicryl in a running locked fashion in 1 layer.  External anal sphincter was intact.  200mcg of Cytotec was given orally and 200mcg given SL to assist with uterine tone.    3 noted left groin intertriginous skin tag type lesions.  Patient desired excision.  Excised completely x 3.  Lesions ranged 3 mm to 5 mm.  1 simple interrupted stitch of 4-0 Monocryl.  Excellent approximation and hemostasis.  Specimens sent to pathology.    Needle, gauze and lap count correct.      Anesthesia: Epidural    Perineum: Vaginal, 2nd degree laceration, excision of 3 intertriginous skin tags.    Estimated Blood Loss: 200 mls    Complications: None        Electronically signed by Winnie Marte DO, 03/19/25, 2:19 PM EDT.       [facility] LABOR AND DELIVERY  [address]  [place] [address]  Dept: [phone]  Dept Fax: [phone]  Loc: [phone]

## 2025-03-20 LAB
ABO GROUP BLD: NORMAL
BLD GP AB SCN SERPL QL: POSITIVE
FETAL BLEED: NEGATIVE
NUMBER OF DOSES: ABNORMAL
RH BLD: NEGATIVE

## 2025-03-20 PROCEDURE — 86901 BLOOD TYPING SEROLOGIC RH(D): CPT | Performed by: OBSTETRICS & GYNECOLOGY

## 2025-03-20 PROCEDURE — 86900 BLOOD TYPING SEROLOGIC ABO: CPT | Performed by: OBSTETRICS & GYNECOLOGY

## 2025-03-20 PROCEDURE — 25010000002 RHO D IMMUNE GLOBULIN 1500 UNIT/2ML SOLUTION PREFILLED SYRINGE: Performed by: OBSTETRICS & GYNECOLOGY

## 2025-03-20 PROCEDURE — 85461 HEMOGLOBIN FETAL: CPT | Performed by: OBSTETRICS & GYNECOLOGY

## 2025-03-20 RX ADMIN — ACETAMINOPHEN 650 MG: 325 TABLET ORAL at 03:02

## 2025-03-20 RX ADMIN — ACETAMINOPHEN 650 MG: 325 TABLET ORAL at 09:36

## 2025-03-20 RX ADMIN — DOCUSATE SODIUM 100 MG: 100 CAPSULE, LIQUID FILLED ORAL at 09:36

## 2025-03-20 RX ADMIN — ACETAMINOPHEN 650 MG: 325 TABLET ORAL at 21:21

## 2025-03-20 RX ADMIN — IBUPROFEN 600 MG: 600 TABLET ORAL at 06:18

## 2025-03-20 RX ADMIN — ACETAMINOPHEN 650 MG: 325 TABLET ORAL at 16:59

## 2025-03-20 RX ADMIN — ESCITALOPRAM OXALATE 10 MG: 10 TABLET ORAL at 21:21

## 2025-03-20 RX ADMIN — IBUPROFEN 600 MG: 600 TABLET ORAL at 01:27

## 2025-03-20 RX ADMIN — HUMAN RHO(D) IMMUNE GLOBULIN 1500 UNITS: 1500 SOLUTION INTRAMUSCULAR; INTRAVENOUS at 09:36

## 2025-03-20 RX ADMIN — IBUPROFEN 600 MG: 600 TABLET ORAL at 19:40

## 2025-03-20 RX ADMIN — IBUPROFEN 600 MG: 600 TABLET ORAL at 13:31

## 2025-03-20 NOTE — PROGRESS NOTES
"[facility]  Vaginal Delivery Progress Note    Subjective   Postpartum Day 1: Vaginal Delivery    The patient feels well.  Her pain is well controlled with nonsteroidal anti-inflammatory drugs and Tylenol.   She is ambulating well.  Patient describes her bleeding as moderate lochia.        Objective     Vital Signs Range for the last 24 hours  Temperature: Temp:  [97.6 °F (36.4 °C)-98.5 °F (36.9 °C)] 97.8 °F (36.6 °C)   Temp Source: Temp src: Oral   BP: BP: (119-135)/(64-86) 128/74   Pulse: Heart Rate:  [] 76   Respirations: Resp:  [16-18] 18   SPO2: SpO2:  [98 %-100 %] 98 %   O2 Amount (l/min):     O2 Devices Device (Oxygen Therapy): room air   Weight:       Admit Height:  Height: 172.7 cm (68\")      Physical Exam:  General:  no acute distress.  Abdomen: abdomen is soft without significant tenderness, masses, organomegaly or guarding. Fundus: appropriate, firm, non tender  Extremities: normal, atraumatic, no cyanosis, and trace edema.       Lab results reviewed:  Yes   Rubella:  No results found for: \"RUBELLAIGGIN\" Nurse Transcribed from prenatal record --    Rubella Antibodies, IgG   Date Value Ref Range Status   08/28/2024 1.66 Immune >0.99 index Final     Comment:                                     Non-immune       <0.90                                  Equivocal  0.90 - 0.99                                  Immune           >0.99     Rh Status:    RH type   Date Value Ref Range Status   03/20/2025 Negative  Final     Immunizations:   Immunization History   Administered Date(s) Administered    DTaP 02/25/2005, 04/15/2005, 06/20/2005, 06/27/2008, 12/30/2008    FluMist 2-49yrs (Nasal) 11/29/2011    Hep A, 2 Dose 12/30/2008, 01/19/2010    Hep B, Adolescent or Pediatric 2004, 04/15/2005, 02/25/2008    Hib (PRP-OMP) 02/25/2005, 04/15/2005, 06/20/2005, 12/29/2005    Hpv9 03/27/2018, 10/24/2018    IPV 02/25/2005, 04/15/2005, 06/20/2005, 12/30/2008    MMR 03/16/2006, 12/30/2008    Meningococcal MCV4P " (Menactra) 2018, 2022    PEDS-Pneumococcal Conjugate (PCV7) 2005, 04/15/2005, 2005, 2006    Rho (D) Immune Globulin 2025, 2025    Tdap 2018    Varicella 2006, 2008       Assessment & Plan       Normal labor    Anxiety and depression    Supervision of other normal pregnancy, antepartum    Rh negative status during pregnancy    Encounter for induction of labor    Current every day vaping     (normal spontaneous vaginal delivery)    Skin tag      Washington Aisha is Day 1  post-partum  Vaginal, Spontaneous  .      Plan:  Continue current care.      Amado Gordon MD  3/20/2025  10:18 EDT

## 2025-03-20 NOTE — PLAN OF CARE
Goal Outcome Evaluation:  Plan of Care Reviewed With: patient        Progress: improving  Outcome Evaluation: Patient vital signs WNL. Up ad luis in room. Voiding spontaneously and without difficulty. Continuing to breastfeed infant and using nipple shield. Pumped x1 this shift.

## 2025-03-20 NOTE — LACTATION NOTE
LC in to assist with this feeding. Baby did need waking. Mom preferred to start with the nipple shield but baby showed poor effort. LC then demonstrated more waking and baby still showed poor effort. LC did not a tongue restriction and did like to keep her tongue towards the roof of her mouth. Nipple shield removed and baby showed more interest but still not enough to latch. LC encouraged skin to skin and was to retry in about 30 minutes but baby did go for a hearing screen. LC then encouraged mother to pump.

## 2025-03-20 NOTE — PLAN OF CARE
Goal Outcome Evaluation:              Outcome Evaluation: VSS, lochia WNL, pain controlled, tolerating PO, up ad luis, bonding well with baby

## 2025-03-21 VITALS
HEIGHT: 68 IN | RESPIRATION RATE: 17 BRPM | OXYGEN SATURATION: 98 % | BODY MASS INDEX: 27.28 KG/M2 | SYSTOLIC BLOOD PRESSURE: 125 MMHG | HEART RATE: 82 BPM | TEMPERATURE: 98 F | WEIGHT: 180 LBS | DIASTOLIC BLOOD PRESSURE: 70 MMHG

## 2025-03-21 RX ADMIN — ACETAMINOPHEN 650 MG: 325 TABLET ORAL at 09:07

## 2025-03-21 RX ADMIN — WITCH HAZEL: 500 SOLUTION RECTAL; TOPICAL at 11:18

## 2025-03-21 RX ADMIN — ACETAMINOPHEN 650 MG: 325 TABLET ORAL at 04:42

## 2025-03-21 RX ADMIN — DOCUSATE SODIUM 100 MG: 100 CAPSULE, LIQUID FILLED ORAL at 09:08

## 2025-03-21 RX ADMIN — IBUPROFEN 600 MG: 600 TABLET ORAL at 01:04

## 2025-03-21 RX ADMIN — IBUPROFEN 600 MG: 600 TABLET ORAL at 06:25

## 2025-03-21 RX ADMIN — BENZOCAINE AND LEVOMENTHOL: 200; 5 SPRAY TOPICAL at 11:18

## 2025-03-21 NOTE — PROGRESS NOTES
Surgical Assist Note      I was responsible for performing the following activities: Retraction, Suction, Irrigation, Closing, and applying fundal pressure for delivery of Fetus and skilled assistance was necessary for the success of this case.      Magali Stein MD

## 2025-03-21 NOTE — DISCHARGE INSTR - APPOINTMENTS
Follow up with Dr. King on Friday, April 4th at 0830     Follow up with Dr. Yang on Tuesday, May 6th at 3pm

## 2025-03-21 NOTE — PLAN OF CARE
Problem: Adult Inpatient Plan of Care  Goal: Plan of Care Review  Outcome: Progressing  Goal: Patient-Specific Goal (Individualized)  Outcome: Progressing  Goal: Absence of Hospital-Acquired Illness or Injury  Outcome: Progressing  Intervention: Identify and Manage Fall Risk  Recent Flowsheet Documentation  Taken 3/20/2025 1945 by Sofia Freeman RN  Safety Promotion/Fall Prevention: safety round/check completed  Intervention: Prevent Skin Injury  Recent Flowsheet Documentation  Taken 3/20/2025 1945 by Sofia Freeman RN  Body Position: position changed independently  Goal: Optimal Comfort and Wellbeing  Outcome: Progressing  Intervention: Monitor Pain and Promote Comfort  Recent Flowsheet Documentation  Taken 3/20/2025 1945 by Sofia Freeman RN  Pain Management Interventions: pain medication given  Intervention: Provide Person-Centered Care  Recent Flowsheet Documentation  Taken 3/20/2025 1945 by Sofia Freeman RN  Trust Relationship/Rapport:   care explained   choices provided   emotional support provided   empathic listening provided   questions answered   questions encouraged   reassurance provided   thoughts/feelings acknowledged  Goal: Readiness for Transition of Care  Outcome: Progressing     Problem: Breastfeeding  Goal: Effective Breastfeeding  Outcome: Progressing     Problem: Postpartum (Vaginal Delivery)  Goal: Successful Parent Role Transition  Outcome: Progressing  Goal: Hemostasis  Outcome: Progressing  Goal: Absence of Infection Signs and Symptoms  Outcome: Progressing  Goal: Anesthesia/Sedation Recovery  Outcome: Progressing  Intervention: Optimize Anesthesia Recovery  Recent Flowsheet Documentation  Taken 3/20/2025 1945 by Sofia Freeman RN  Safety Promotion/Fall Prevention: safety round/check completed  Goal: Optimal Pain Control and Function  Outcome: Progressing  Intervention: Prevent or Manage Pain  Recent Flowsheet Documentation  Taken 3/20/2025 1945 by  Sofia Freeman, RN  Perineal Care: perineal hygiene encouraged  Pain Management Interventions: pain medication given  Goal: Effective Urinary Elimination  Outcome: Progressing     Problem: Labor  Goal: Stable Fetal Wellbeing  Intervention: Promote and Monitor Fetal Wellbeing  Recent Flowsheet Documentation  Taken 3/20/2025 1945 by Sofia Freeman, RN  Body Position: position changed independently   Goal Outcome Evaluation:   Vital signs and lochia WNL. Fundus firm. Maternal and infant bonding well. Pain well controlled with medication.

## 2025-03-21 NOTE — ANESTHESIA POSTPROCEDURE EVALUATION
Patient: Washington Louie-Rodas    Procedure Summary       Date: 03/19/25 Room / Location:     Anesthesia Start: 0257 Anesthesia Stop: 1349    Procedure: LABOR ANALGESIA Diagnosis:     Scheduled Providers:  Provider: Jemal Holcomb CRNA    Anesthesia Type: epidural ASA Status: 2            Anesthesia Type: epidural    Vitals  Vitals Value Taken Time   /89 03/20/25 17:28   Temp 36.6 °C (97.9 °F) 03/20/25 17:28   Pulse 120 03/20/25 17:28   Resp 18 03/20/25 17:28   SpO2 99 % 03/19/25 14:39   Vitals shown include unfiled device data.        Post Anesthesia Care and Evaluation    Patient location during evaluation: bedside  Patient participation: complete - patient participated  Level of consciousness: awake  Pain management: adequate    Airway patency: patent  Anesthetic complications: No anesthetic complications  PONV Status: controlled  Cardiovascular status: acceptable and stable  Respiratory status: acceptable  Hydration status: acceptable  Post Neuraxial Block status: Motor and sensory function returned to baseline and No signs or symptoms of PDPH

## 2025-03-21 NOTE — PLAN OF CARE
Goal Outcome Evaluation:              Outcome Evaluation: Mother discharging home with infant.

## 2025-03-21 NOTE — PROGRESS NOTES
[facility]  Vaginal Delivery Progress Note    Subjective   Postpartum Day 2: Vaginal Delivery    The patient feels well.  Her pain is well controlled with nonsteroidal anti-inflammatory drugs and Tylenol.   She is ambulating well.  Patient describes her bleeding as moderate lochia.    Breastfeeding: Both breast and formula .    Objective     Vital Signs Range for the last 24 hours  Temperature: Temp:  [97.4 °F (36.3 °C)-98 °F (36.7 °C)] 97.4 °F (36.3 °C)   Temp Source: Temp src: Oral   BP: BP: (120-127)/(75-89) 125/75   Pulse: Heart Rate:  [] 62   Respirations: Resp:  [16-18] 16       Physical Exam:  General:  no acute distress.  Abdomen: abdomen is soft without significant tenderness, masses, organomegaly or guarding.   Fundus: appropriate, firm, non tender, small lochia   Extremities: normal, atraumatic, no cyanosis, and trace edema.     Rubella:   Rubella Antibodies, IgG   Date Value Ref Range Status   08/28/2024 1.66 Immune >0.99 index Final     Comment:                                     Non-immune       <0.90                                  Equivocal  0.90 - 0.99                                  Immune           >0.99     Rh Status:    RH type   Date Value Ref Range Status   03/20/2025 Negative  Final     Immunizations:   Immunization History   Administered Date(s) Administered    DTaP 02/25/2005, 04/15/2005, 06/20/2005, 06/27/2008, 12/30/2008    FluMist 2-49yrs (Nasal) 11/29/2011    Hep A, 2 Dose 12/30/2008, 01/19/2010    Hep B, Adolescent or Pediatric 2004, 04/15/2005, 02/25/2008    Hib (PRP-OMP) 02/25/2005, 04/15/2005, 06/20/2005, 12/29/2005    Hpv9 03/27/2018, 10/24/2018    IPV 02/25/2005, 04/15/2005, 06/20/2005, 12/30/2008    MMR 03/16/2006, 12/30/2008    Meningococcal MCV4P (Menactra) 03/27/2018, 01/05/2022    PEDS-Pneumococcal Conjugate (PCV7) 02/25/2005, 04/15/2005, 06/20/2005, 03/16/2006    Rho (D) Immune Globulin 01/14/2025, 03/20/2025    Tdap 03/27/2018    Varicella 03/16/2006,  2008                  Assessment & Plan       Normal labor    Anxiety and depression    Supervision of other normal pregnancy, antepartum    Rh negative status during pregnancy    Encounter for induction of labor    Current every day vaping     (normal spontaneous vaginal delivery)    Skin tag    Infant blood type O positive- Rhogam indicated prior to discharge.    Washington Leonard is Day 2  post-partum  Vaginal, Spontaneous  .      Plan:  Continue current care.            Rhogam already given            Discharge to home today with instructions to follow up in the office in 5-6 weeks with Dr Yang.            Discharge scripts and instructions given.      Magali Stein MD    Electronically signed by Magali Stein MD, 25, 9:00 AM EDT.

## 2025-03-21 NOTE — LACTATION NOTE
LC in to follow up with lactation progress. Mother states That baby still struggles with breastfeeding. She is still falling asleep at the breast. She states she plans to pump and bottle feed but is still intersted in latching baby. She states she has not pumped through the night. LC discussed that the key is pumping with each baby feeding or more often to bring in an adequate milk supply. LC suggested a lactation appt in about 1 week. Patient requests to wait and make the appt at a later date. Patient is planning on discharge today. LC discussed normal infant output patterns to expect and if infant is not waking by 3 hours to wake and feed using measures shown in the hospital. LC discussed checking to make sure new medications are safe to breastfeed. LC discussed alcohol use and cigarette/second hand smoke around baby and breastfeeding and discussed the impact of street drugs on infants and breastfeeding. LC used the page in the breastfeeding guide to discuss harmful effects of these. Breastfeeding/Lactation expectations and anticipatory guidance discussed for the next two weeks . LC discussed nipple care, plugged ducts, engorgement, and breast infection. LC encouraged mom to see pediatrician two days from discharge for follow up. Patient has a breastpump for home use and LC discussed good pumping guidelines and normal expectations with pumping and storage and preparation of ebm for feedings. LC discussed breastfeeding/lactation resources including the local breastfeeding support group after discharge and when to call the doctor. Patient showed good understanding.

## 2025-03-22 ENCOUNTER — MATERNAL SCREENING (OUTPATIENT)
Dept: CALL CENTER | Facility: HOSPITAL | Age: 20
End: 2025-03-22
Payer: COMMERCIAL

## 2025-03-22 NOTE — OUTREACH NOTE
Maternal Screening Survey      Flowsheet Row Responses   Eligibility Eligible   Prep survey completed? Yes   Facility patient discharged from? [facility] - Registered Nurse

## 2025-03-24 LAB
CYTO UR: NORMAL
LAB AP CASE REPORT: NORMAL
LAB AP CLINICAL INFORMATION: NORMAL
PATH REPORT.FINAL DX SPEC: NORMAL
PATH REPORT.GROSS SPEC: NORMAL

## 2025-03-25 ENCOUNTER — TELEPHONE (OUTPATIENT)
Dept: LACTATION | Facility: HOSPITAL | Age: 20
End: 2025-03-25
Payer: COMMERCIAL

## 2025-03-25 NOTE — TELEPHONE ENCOUNTER
LC called to follow up with breastfeeding progress. Patient states she has changed to formula feeding only.

## 2025-03-27 ENCOUNTER — TELEPHONE (OUTPATIENT)
Dept: OBSTETRICS AND GYNECOLOGY | Age: 20
End: 2025-03-27
Payer: COMMERCIAL

## 2025-03-27 NOTE — TELEPHONE ENCOUNTER
"Per Dr Marte: \"Lesion is consistent with benign noncancerous skin tag, no further follow-up necessary as long as healing appropriately.\"    Left a message for the patient to discus above results. HUB TO RELAY  "

## 2025-03-31 ENCOUNTER — MATERNAL SCREENING (OUTPATIENT)
Dept: CALL CENTER | Facility: HOSPITAL | Age: 20
End: 2025-03-31
Payer: COMMERCIAL

## 2025-03-31 NOTE — OUTREACH NOTE
Maternal Screening Survey      Flowsheet Row Responses   Facility patient discharged from? [facility]   Attempt successful? Yes   Call start time 0859   Call end time 09   I have been able to laugh and see the funny side of things. 0   I have looked forward with enjoyment to things. 0   I have blamed myself unnecessarily when things went wrong. 0   I have been anxious or worried for no good reason. 0   I have felt scared or panicky for no good reason. 0   Things have been getting on top of me. 0   I have been so unhappy that I have had difficulty sleeping. 0   I have felt sad or miserable. 0   I have been so unhappy that I have been crying. 1   The thought of harming myself has occurred to me. 0   [place]  Depression Scale Total 1   Did any of your parents have problems with alcohol or drug use? No   Do any of your peers have problems with alcohol or drug use? No   Does your partner have problems with alcohol or drug use? No   Before you were pregnant did you have problems with alcohol or drug use? (past) No   In the past month, did you drink beer, wine, liquor or use any other drugs? (pregnancy) No   Maternal Screening call completed Yes   Wrap up additional comments Patient doing well. Babys birth certificate is messed up and trying to get this fixed.   Call end time 09              [facility] - Registered Nurse

## 2025-04-04 ENCOUNTER — TELEPHONE (OUTPATIENT)
Dept: OBSTETRICS AND GYNECOLOGY | Age: 20
End: 2025-04-04

## 2025-04-09 NOTE — TELEPHONE ENCOUNTER
4/4 1st attempt left voice mail.    4/9 2nd attempt:  Left patient another voicemail to r/s the 4/4 no show for 2 wk postpartum.

## 2025-04-14 NOTE — TELEPHONE ENCOUNTER
4/14 3rd attempt to try and reach patient - rcvd her voicemail - left message requesting she call back to r/s the 1st postpartum appointment she missed & can address skin tag issue of not healing, at that visit. As Kristine/Jennifer address in the [employer] message, that the patient read on 04 08 2025.

## 2025-04-29 NOTE — PROGRESS NOTES
"POSTPARTUM Follow Up Visit      Chief Complaint   Patient presents with    Postpartum Care     HPI:    Date of delivery: 3/19/25  Delivery type:    Perineum:  Vaginal , 2nd degree laceration, and excision of left intertriginous groin lesions x 3   Delivering Provider:   Dr. Winnie Marte        Feeding: Bottle  Pain:  no  Vaginal Bleeding:  no  Plans for BC:  wants to discuss    Depressed/Anxious:  no  EPDS score: 3  #10: 0  Hx of anxiety and depression on Lexapro 10mg    Weight at last OB OV:  180lb  Last pap date and result: NA    L&D Delivery Note by Winnie Marte DO (2025 14:19)  Tissue Pathology Exam (2025 14:35)skin tags  Discharge Summary by Magali Stein MD (2025 14:24)      PHYSICAL EXAM:  /79   Pulse 102   Ht 172.7 cm (68\")   Wt 74.8 kg (165 lb)   Breastfeeding No   BMI 25.09 kg/m²  Not found.  Chaperone present during breast and/or pelvic exam if performed.  General- NAD, alert and oriented, appropriate  Psych- Normal mood, good memory, good eye contact      Abdomen- Soft, non distended, non tender, no masses    External genitalia- Normal.    Urethra/Bladder/Vagina- Normal, no masses, non-tender  Prolapse : none noted    STITCHES : Intact, well approximated, no evidence of infection  Cervix- Normal, no lesions, no discharge, no CMT  Uterus- Normal size, shape & consistency.  Non tender, mobile.  Adnexa- Normal, no mass, non-tender    Lymphatic- No palpable groin nodes  Extremities- No edema    ASSESSMENT AND PLAN:  Diagnoses and all orders for this visit:    1. Postpartum follow-up (Primary)    2. Birth control counseling  -     etonogestrel-ethinyl estradiol (NuvaRing) 0.12-0.015 MG/24HR vaginal ring; Insert 1 each into the vagina Every 28 (Twenty-Eight) Days. Insert vaginally and leave in place for 3 consecutive weeks, then remove for 1 week.  Dispense: 3 each; Refill: 3    3. Anxiety and depression  Comments:  doing well  Overview:  Stable on Lexapro 10mg daily     Orders:  - "     escitalopram (LEXAPRO) 10 MG tablet; Take 1 tablet by mouth Daily.  Dispense: 90 tablet; Refill: 4    4. Skin tag  Overview:  Excised at delivery, left intertriginous area, x 3        Counseling:    All birth control options reviewed in detail.  R/B/A/SE/E of each wrt pts PMHx and prior BC use  Ok to resume intercourse  May resume normal activities  Core strengthening exercises reviewed and recommended  Kegel exercises reviewed and recommended  Ok to return to work/school once patient desires/maternity leave completed  Use backup contraception for 4 weeks after initiating chosen BC    BIRTH CONTROL WENDY risks of thromboembolic disease reviewed.  S/Sx to watch for discussed and questions answered.  Newer studies indicate possible increased breast cancer reviewed. Today patient denies any contraindications for WENDY including HTN, migraine HA w aura, personal or FHx DVT/PE/thrombophilia.     Follow Up:  Return for WWE w pap after 20st birthday.          Winnie Marte DO  05/02/2025    [facility] [address] [facility]  [address] [place] [address] [address] [place] [address]  Dept: [phone]  Dept Fax: [phone]  Loc: [phone]  Loc Fax: [phone]

## 2025-04-30 PROBLEM — Z67.91 RH NEGATIVE STATUS DURING PREGNANCY: Status: RESOLVED | Noted: 2025-02-19 | Resolved: 2025-04-30

## 2025-04-30 PROBLEM — Z37.9 NORMAL LABOR: Status: RESOLVED | Noted: 2025-03-18 | Resolved: 2025-04-30

## 2025-04-30 PROBLEM — Z72.89 CURRENT EVERY DAY VAPING: Status: RESOLVED | Noted: 2025-03-19 | Resolved: 2025-04-30

## 2025-04-30 PROBLEM — Z34.80 SUPERVISION OF OTHER NORMAL PREGNANCY, ANTEPARTUM: Status: RESOLVED | Noted: 2024-08-28 | Resolved: 2025-04-30

## 2025-04-30 PROBLEM — O26.899 RH NEGATIVE STATUS DURING PREGNANCY: Status: RESOLVED | Noted: 2025-02-19 | Resolved: 2025-04-30

## 2025-04-30 PROBLEM — Z34.90 ENCOUNTER FOR INDUCTION OF LABOR: Status: RESOLVED | Noted: 2025-03-19 | Resolved: 2025-04-30

## 2025-05-02 ENCOUNTER — POSTPARTUM VISIT (OUTPATIENT)
Dept: OBSTETRICS AND GYNECOLOGY | Age: 20
End: 2025-05-02
Payer: COMMERCIAL

## 2025-05-02 VITALS
BODY MASS INDEX: 25.01 KG/M2 | DIASTOLIC BLOOD PRESSURE: 79 MMHG | HEIGHT: 68 IN | WEIGHT: 165 LBS | SYSTOLIC BLOOD PRESSURE: 114 MMHG | HEART RATE: 102 BPM

## 2025-05-02 DIAGNOSIS — L91.8 SKIN TAG: ICD-10-CM

## 2025-05-02 DIAGNOSIS — Z30.09 BIRTH CONTROL COUNSELING: ICD-10-CM

## 2025-05-02 DIAGNOSIS — F41.9 ANXIETY AND DEPRESSION: ICD-10-CM

## 2025-05-02 DIAGNOSIS — F32.A ANXIETY AND DEPRESSION: ICD-10-CM

## 2025-05-02 RX ORDER — ETONOGESTREL AND ETHINYL ESTRADIOL VAGINAL RING .015; .12 MG/D; MG/D
1 RING VAGINAL
Qty: 3 EACH | Refills: 3 | Status: SHIPPED | OUTPATIENT
Start: 2025-05-02 | End: 2026-05-02

## 2025-05-02 RX ORDER — ESCITALOPRAM OXALATE 10 MG/1
10 TABLET ORAL DAILY
Qty: 90 TABLET | Refills: 4 | Status: SHIPPED | OUTPATIENT
Start: 2025-05-02

## 2025-05-20 RX ORDER — DROSPIRENONE AND ETHINYL ESTRADIOL 0.02-3(28)
1 KIT ORAL DAILY
Qty: 84 TABLET | Refills: 3 | Status: SHIPPED | OUTPATIENT
Start: 2025-05-20

## 2025-06-06 NOTE — PROGRESS NOTES
The tilt table study showed no significant findings.  Heart rate was again noted to be on the higher side, but there was no variation in blood pressure heart rate during the study.  There were no arrhythmias.  Previous Holter monitor study also showed higher heart rates with no arrhythmias.    Recommend to start Toprol-XL 25 mg half tablet daily at bedtime to prevent heart rate from going too high.  Also follow adequate hydration.  Echocardiogram is due for next week and will follow the results.    Toprol-XL/metoprolol is not a good medication to take during pregnancy.  Please make the patient aware.      Electronically signed by Avila Zelaya MD, 08/31/23, 9:22 AM EDT.  
The patient is a 89y Female complaining of abdominal pain.

## 2025-07-23 ENCOUNTER — NURSE TRIAGE (OUTPATIENT)
Dept: CALL CENTER | Facility: HOSPITAL | Age: 20
End: 2025-07-23
Payer: COMMERCIAL

## 2025-07-23 NOTE — TELEPHONE ENCOUNTER
HUB call - caller with episodes of blurred vision and headaches requesting appointment. Unable to reach PCP office / [place].    Spoke with caller who reports having epsiodes of blurred vision which resolves and then develops headache which last < 24 hours since giving birth in March. States has been evaluated by her OB/GYN for these symptoms and was told to f/u with her PCP. Caller unsure of what BP readings are and states is usually lower and did not have any BP elevation issues during pregnancy.    Attempted call to PCP office/[place] x 2 with no answer. Advised caller to go to [facility] for evaluation by HCP with BP check today as she is currently having headache. Verbalized understanding.    Reason for Disposition   [1] MODERATE headache (e.g., interferes with normal activities) AND [2] present > 24 hours AND [3] unexplained  (Exceptions: analgesics not tried, typical migraine, or headache part of viral illness)    Additional Information   Negative: Difficult to awaken or acting confused (e.g., disoriented, slurred speech)   Negative: [1] Weakness of the face, arm or leg on one side of the body AND [2] new-onset   Negative: [1] Numbness of the face, arm or leg on one side of the body AND [2] new-onset   Negative: [1] Loss of speech or garbled speech AND [2] new-onset   Negative: Passed out (i.e., lost consciousness, collapsed and was not responding)   Negative: Sounds like a life-threatening emergency to the triager   Negative: Followed a head injury   Negative: Pregnant   Negative: Postpartum (from 0 to 6 weeks after delivery)   Negative: Traumatic Brain Injury (TBI) is suspected   Negative: Unable to walk, or can only walk with assistance (e.g., requires support)   Negative: Stiff neck (can't touch chin to chest)   Negative: Severe pain in one eye   Negative: [1] Other family members (or people in same household) with headaches AND [2] possibility of carbon monoxide exposure   Negative: [1] SEVERE  "headache (e.g., excruciating) AND [2] \"worst headache\" of life   Negative: [1] SEVERE headache AND [2] sudden-onset (i.e., reaching maximum intensity within seconds to 1 hour)   Negative: [1] SEVERE headache AND [2] fever   Negative: Loss of vision or double vision  (Exception: Same as prior migraines.)   Negative: [1] Fever > 100.0 F (37.8 C) AND [2] diabetes mellitus or weak immune system (e.g., HIV positive, cancer chemo, splenectomy, organ transplant, chronic steroids)   Negative: Patient sounds very sick or weak to the triager   Negative: [1] SEVERE headache (e.g., excruciating) AND [2] not improved after 2 hours of pain medicine   Negative: [1] Vomiting AND [2] 2 or more times  (Exception: Similar to previous migraines.)   Negative: Fever > 104 F (40 C)    Answer Assessment - Initial Assessment Questions  1. LOCATION: \"Where does it hurt?\"       Top front  2. ONSET: \"When did the headache start?\" (Minutes, hours or days)       Has occasional episodes since gave birth in March experiencing blurred vision which resolves on own and then develops headache  3. PATTERN: \"Does the pain come and go, or has it been constant since it started?\"      Comes and goes  4. SEVERITY: \"How bad is the pain?\" and \"What does it keep you from doing?\"  (e.g., Scale 1-10; mild, moderate, or severe)    - MILD (1-3): doesn't interfere with normal activities     - MODERATE (4-7): interferes with normal activities or awakens from sleep     - SEVERE (8-10): excruciating pain, unable to do any normal activities         Mild to moderate  5. RECURRENT SYMPTOM: \"Have you ever had headaches before?\" If Yes, ask: \"When was the last time?\" and \"What happened that time?\"       Yes. Has been seen by OB/Gyn for this - was told to f/u with PCP  6. CAUSE: \"What do you think is causing the headache?\"      Unsure. States has never had elevated BPs  7. MIGRAINE: \"Have you been diagnosed with migraine headaches?\" If Yes, ask: \"Is this headache similar?\" " "      No  8. HEAD INJURY: \"Has there been any recent injury to the head?\"       No  9. OTHER SYMPTOMS: \"Do you have any other symptoms?\" (fever, stiff neck, eye pain, sore throat, cold symptoms)      Experiences blurred vision prior to headaches  10. PREGNANCY: \"Is there any chance you are pregnant?\" \"When was your last menstrual period?\"        Denies    Protocols used: Headache-ADULT-    "

## 2025-07-25 ENCOUNTER — TELEPHONE (OUTPATIENT)
Dept: FAMILY MEDICINE CLINIC | Facility: CLINIC | Age: 20
End: 2025-07-25
Payer: COMMERCIAL

## 2025-07-25 NOTE — TELEPHONE ENCOUNTER
"Relay & Schedule     \"Called patient to schedule an appointment with PCP ZHAO Dhaliwal.\"                "

## 2025-08-12 ENCOUNTER — OFFICE VISIT (OUTPATIENT)
Dept: FAMILY MEDICINE CLINIC | Facility: CLINIC | Age: 20
End: 2025-08-12
Payer: COMMERCIAL

## 2025-08-12 ENCOUNTER — LAB (OUTPATIENT)
Dept: LAB | Facility: HOSPITAL | Age: 20
End: 2025-08-12
Payer: COMMERCIAL

## 2025-08-12 VITALS
WEIGHT: 169.2 LBS | SYSTOLIC BLOOD PRESSURE: 123 MMHG | OXYGEN SATURATION: 97 % | DIASTOLIC BLOOD PRESSURE: 78 MMHG | BODY MASS INDEX: 25.64 KG/M2 | HEART RATE: 102 BPM | HEIGHT: 68 IN

## 2025-08-12 DIAGNOSIS — Z13.220 LIPID SCREENING: ICD-10-CM

## 2025-08-12 DIAGNOSIS — R51.9 NEW ONSET HEADACHE: ICD-10-CM

## 2025-08-12 DIAGNOSIS — Z00.00 ANNUAL PHYSICAL EXAM: Primary | ICD-10-CM

## 2025-08-12 DIAGNOSIS — Z00.00 ANNUAL PHYSICAL EXAM: ICD-10-CM

## 2025-08-12 DIAGNOSIS — Z13.29 THYROID DISORDER SCREEN: ICD-10-CM

## 2025-08-12 DIAGNOSIS — H53.8 BLURRED VISION: ICD-10-CM

## 2025-08-12 LAB
ALBUMIN SERPL-MCNC: 4.4 G/DL (ref 3.5–5.2)
ALBUMIN/GLOB SERPL: 1.4 G/DL
ALP SERPL-CCNC: 113 U/L (ref 39–117)
ALT SERPL W P-5'-P-CCNC: 14 U/L (ref 1–33)
ANION GAP SERPL CALCULATED.3IONS-SCNC: 12.3 MMOL/L (ref 5–15)
AST SERPL-CCNC: 17 U/L (ref 1–32)
BASOPHILS # BLD AUTO: 0.05 10*3/MM3 (ref 0–0.2)
BASOPHILS NFR BLD AUTO: 0.6 % (ref 0–1.5)
BILIRUB SERPL-MCNC: 0.3 MG/DL (ref 0–1.2)
BUN SERPL-MCNC: 10 MG/DL (ref 6–20)
BUN/CREAT SERPL: 13.2 (ref 7–25)
CALCIUM SPEC-SCNC: 9.9 MG/DL (ref 8.6–10.5)
CHLORIDE SERPL-SCNC: 104 MMOL/L (ref 98–107)
CHOLEST SERPL-MCNC: 246 MG/DL (ref 0–200)
CO2 SERPL-SCNC: 22.7 MMOL/L (ref 22–29)
CREAT SERPL-MCNC: 0.76 MG/DL (ref 0.57–1)
DEPRECATED RDW RBC AUTO: 41 FL (ref 37–54)
EGFRCR SERPLBLD CKD-EPI 2021: 115.2 ML/MIN/1.73
EOSINOPHIL # BLD AUTO: 0.13 10*3/MM3 (ref 0–0.4)
EOSINOPHIL NFR BLD AUTO: 1.6 % (ref 0.3–6.2)
ERYTHROCYTE [DISTWIDTH] IN BLOOD BY AUTOMATED COUNT: 12.8 % (ref 12.3–15.4)
GLOBULIN UR ELPH-MCNC: 3.2 GM/DL
GLUCOSE SERPL-MCNC: 72 MG/DL (ref 65–99)
HCT VFR BLD AUTO: 43.3 % (ref 34–46.6)
HDLC SERPL-MCNC: 41 MG/DL (ref 40–60)
HGB BLD-MCNC: 14.6 G/DL (ref 12–15.9)
IMM GRANULOCYTES # BLD AUTO: 0.02 10*3/MM3 (ref 0–0.05)
IMM GRANULOCYTES NFR BLD AUTO: 0.3 % (ref 0–0.5)
LDLC SERPL CALC-MCNC: 131 MG/DL (ref 0–100)
LDLC/HDLC SERPL: 2.99 {RATIO}
LYMPHOCYTES # BLD AUTO: 1.83 10*3/MM3 (ref 0.7–3.1)
LYMPHOCYTES NFR BLD AUTO: 23 % (ref 19.6–45.3)
MCH RBC QN AUTO: 29.6 PG (ref 26.6–33)
MCHC RBC AUTO-ENTMCNC: 33.7 G/DL (ref 31.5–35.7)
MCV RBC AUTO: 87.8 FL (ref 79–97)
MONOCYTES # BLD AUTO: 0.58 10*3/MM3 (ref 0.1–0.9)
MONOCYTES NFR BLD AUTO: 7.3 % (ref 5–12)
NEUTROPHILS NFR BLD AUTO: 5.35 10*3/MM3 (ref 1.7–7)
NEUTROPHILS NFR BLD AUTO: 67.2 % (ref 42.7–76)
NRBC BLD AUTO-RTO: 0 /100 WBC (ref 0–0.2)
PLATELET # BLD AUTO: 302 10*3/MM3 (ref 140–450)
PMV BLD AUTO: 9.8 FL (ref 6–12)
POTASSIUM SERPL-SCNC: 4.6 MMOL/L (ref 3.5–5.2)
PROT SERPL-MCNC: 7.6 G/DL (ref 6–8.5)
RBC # BLD AUTO: 4.93 10*6/MM3 (ref 3.77–5.28)
SODIUM SERPL-SCNC: 139 MMOL/L (ref 136–145)
T4 FREE SERPL-MCNC: 0.95 NG/DL (ref 0.92–1.68)
TRIGL SERPL-MCNC: 413 MG/DL (ref 0–150)
TSH SERPL DL<=0.05 MIU/L-ACNC: 0.92 UIU/ML (ref 0.27–4.2)
VLDLC SERPL-MCNC: 74 MG/DL (ref 5–40)
WBC NRBC COR # BLD AUTO: 7.96 10*3/MM3 (ref 3.4–10.8)

## 2025-08-12 PROCEDURE — 80061 LIPID PANEL: CPT

## 2025-08-12 PROCEDURE — 80050 GENERAL HEALTH PANEL: CPT

## 2025-08-12 PROCEDURE — 36415 COLL VENOUS BLD VENIPUNCTURE: CPT

## 2025-08-12 PROCEDURE — 84439 ASSAY OF FREE THYROXINE: CPT

## 2025-08-14 ENCOUNTER — TELEPHONE (OUTPATIENT)
Dept: FAMILY MEDICINE CLINIC | Facility: CLINIC | Age: 20
End: 2025-08-14
Payer: COMMERCIAL

## 2025-08-18 RX ORDER — SUMATRIPTAN 50 MG/1
TABLET, FILM COATED ORAL
Qty: 9 TABLET | Refills: 0 | Status: SHIPPED | OUTPATIENT
Start: 2025-08-18